# Patient Record
Sex: FEMALE | Employment: FULL TIME | URBAN - METROPOLITAN AREA
[De-identification: names, ages, dates, MRNs, and addresses within clinical notes are randomized per-mention and may not be internally consistent; named-entity substitution may affect disease eponyms.]

---

## 2018-06-15 ENCOUNTER — CLINICAL SUPPORT (OUTPATIENT)
Dept: URGENT CARE | Facility: CLINIC | Age: 58
End: 2018-06-15

## 2018-06-15 DIAGNOSIS — Z02.83 ENCOUNTER FOR DRUG SCREENING: Primary | ICD-10-CM

## 2018-06-15 PROCEDURE — 80305 DRUG TEST PRSMV DIR OPT OBS: CPT | Mod: S$GLB,,, | Performed by: PREVENTIVE MEDICINE

## 2022-02-11 ENCOUNTER — LAB VISIT (OUTPATIENT)
Dept: LAB | Facility: HOSPITAL | Age: 62
End: 2022-02-11
Attending: FAMILY MEDICINE
Payer: COMMERCIAL

## 2022-02-11 ENCOUNTER — OFFICE VISIT (OUTPATIENT)
Dept: PRIMARY CARE CLINIC | Facility: CLINIC | Age: 62
End: 2022-02-11
Payer: COMMERCIAL

## 2022-02-11 VITALS
TEMPERATURE: 98 F | OXYGEN SATURATION: 97 % | WEIGHT: 229.94 LBS | HEIGHT: 62 IN | DIASTOLIC BLOOD PRESSURE: 74 MMHG | BODY MASS INDEX: 42.31 KG/M2 | SYSTOLIC BLOOD PRESSURE: 136 MMHG | HEART RATE: 75 BPM

## 2022-02-11 DIAGNOSIS — Z00.00 ANNUAL PHYSICAL EXAM: ICD-10-CM

## 2022-02-11 DIAGNOSIS — K58.1 IRRITABLE BOWEL SYNDROME WITH CONSTIPATION: ICD-10-CM

## 2022-02-11 DIAGNOSIS — Z00.00 ANNUAL PHYSICAL EXAM: Primary | ICD-10-CM

## 2022-02-11 DIAGNOSIS — R25.3 MUSCLE TWITCHING: ICD-10-CM

## 2022-02-11 DIAGNOSIS — M25.50 ARTHRALGIA, UNSPECIFIED JOINT: ICD-10-CM

## 2022-02-11 DIAGNOSIS — M18.11 OSTEOARTHRITIS OF CARPOMETACARPAL (CMC) JOINT OF RIGHT THUMB, UNSPECIFIED OSTEOARTHRITIS TYPE: ICD-10-CM

## 2022-02-11 DIAGNOSIS — K21.9 GASTROESOPHAGEAL REFLUX DISEASE WITHOUT ESOPHAGITIS: ICD-10-CM

## 2022-02-11 DIAGNOSIS — F41.8 ANXIETY ASSOCIATED WITH DEPRESSION: ICD-10-CM

## 2022-02-11 DIAGNOSIS — R20.2 PARESTHESIA: ICD-10-CM

## 2022-02-11 DIAGNOSIS — F41.9 ANXIETY: ICD-10-CM

## 2022-02-11 DIAGNOSIS — K44.9 HIATAL HERNIA: ICD-10-CM

## 2022-02-11 PROBLEM — B00.9 HERPES: Status: ACTIVE | Noted: 2022-02-11

## 2022-02-11 PROBLEM — G47.33 OSA (OBSTRUCTIVE SLEEP APNEA): Status: ACTIVE | Noted: 2022-02-11

## 2022-02-11 PROBLEM — R23.2 HOT FLASHES: Status: ACTIVE | Noted: 2022-02-11

## 2022-02-11 LAB
BASOPHILS # BLD AUTO: 0.05 K/UL (ref 0–0.2)
BASOPHILS NFR BLD: 0.5 % (ref 0–1.9)
CCP AB SER IA-ACNC: 0.6 U/ML
DIFFERENTIAL METHOD: ABNORMAL
EOSINOPHIL # BLD AUTO: 0.1 K/UL (ref 0–0.5)
EOSINOPHIL NFR BLD: 1.5 % (ref 0–8)
ERYTHROCYTE [DISTWIDTH] IN BLOOD BY AUTOMATED COUNT: 13.3 % (ref 11.5–14.5)
HCT VFR BLD AUTO: 42.7 % (ref 37–48.5)
HGB BLD-MCNC: 13.3 G/DL (ref 12–16)
IMM GRANULOCYTES # BLD AUTO: 0.05 K/UL (ref 0–0.04)
IMM GRANULOCYTES NFR BLD AUTO: 0.5 % (ref 0–0.5)
LYMPHOCYTES # BLD AUTO: 3.7 K/UL (ref 1–4.8)
LYMPHOCYTES NFR BLD: 38.3 % (ref 18–48)
MCH RBC QN AUTO: 28.2 PG (ref 27–31)
MCHC RBC AUTO-ENTMCNC: 31.1 G/DL (ref 32–36)
MCV RBC AUTO: 91 FL (ref 82–98)
MONOCYTES # BLD AUTO: 0.5 K/UL (ref 0.3–1)
MONOCYTES NFR BLD: 5 % (ref 4–15)
NEUTROPHILS # BLD AUTO: 5.2 K/UL (ref 1.8–7.7)
NEUTROPHILS NFR BLD: 54.2 % (ref 38–73)
NRBC BLD-RTO: 0 /100 WBC
PLATELET # BLD AUTO: 364 K/UL (ref 150–450)
PMV BLD AUTO: 10.2 FL (ref 9.2–12.9)
RBC # BLD AUTO: 4.71 M/UL (ref 4–5.4)
WBC # BLD AUTO: 9.61 K/UL (ref 3.9–12.7)

## 2022-02-11 PROCEDURE — 82607 VITAMIN B-12: CPT | Performed by: FAMILY MEDICINE

## 2022-02-11 PROCEDURE — 99386 PREV VISIT NEW AGE 40-64: CPT | Mod: S$GLB,,, | Performed by: FAMILY MEDICINE

## 2022-02-11 PROCEDURE — 99999 PR PBB SHADOW E&M-NEW PATIENT-LVL IV: ICD-10-PCS | Mod: PBBFAC,,, | Performed by: FAMILY MEDICINE

## 2022-02-11 PROCEDURE — 84443 ASSAY THYROID STIM HORMONE: CPT | Performed by: FAMILY MEDICINE

## 2022-02-11 PROCEDURE — 83735 ASSAY OF MAGNESIUM: CPT | Performed by: FAMILY MEDICINE

## 2022-02-11 PROCEDURE — 86431 RHEUMATOID FACTOR QUANT: CPT | Performed by: FAMILY MEDICINE

## 2022-02-11 PROCEDURE — 87389 HIV-1 AG W/HIV-1&-2 AB AG IA: CPT | Performed by: FAMILY MEDICINE

## 2022-02-11 PROCEDURE — 36415 COLL VENOUS BLD VENIPUNCTURE: CPT | Mod: PN | Performed by: FAMILY MEDICINE

## 2022-02-11 PROCEDURE — 86803 HEPATITIS C AB TEST: CPT | Performed by: FAMILY MEDICINE

## 2022-02-11 PROCEDURE — 80061 LIPID PANEL: CPT | Performed by: FAMILY MEDICINE

## 2022-02-11 PROCEDURE — 99999 PR PBB SHADOW E&M-NEW PATIENT-LVL IV: CPT | Mod: PBBFAC,,, | Performed by: FAMILY MEDICINE

## 2022-02-11 PROCEDURE — 84439 ASSAY OF FREE THYROXINE: CPT | Performed by: FAMILY MEDICINE

## 2022-02-11 PROCEDURE — 85025 COMPLETE CBC W/AUTO DIFF WBC: CPT | Performed by: FAMILY MEDICINE

## 2022-02-11 PROCEDURE — 80053 COMPREHEN METABOLIC PANEL: CPT | Performed by: FAMILY MEDICINE

## 2022-02-11 PROCEDURE — 82728 ASSAY OF FERRITIN: CPT | Performed by: FAMILY MEDICINE

## 2022-02-11 PROCEDURE — 84466 ASSAY OF TRANSFERRIN: CPT | Performed by: FAMILY MEDICINE

## 2022-02-11 PROCEDURE — 86200 CCP ANTIBODY: CPT | Performed by: FAMILY MEDICINE

## 2022-02-11 PROCEDURE — 99386 PR PREVENTIVE VISIT,NEW,40-64: ICD-10-PCS | Mod: S$GLB,,, | Performed by: FAMILY MEDICINE

## 2022-02-11 RX ORDER — ACYCLOVIR 400 MG/1
400 TABLET ORAL 3 TIMES DAILY PRN
COMMUNITY
Start: 2021-10-29 | End: 2022-03-11 | Stop reason: ALTCHOICE

## 2022-02-11 RX ORDER — HYDROCODONE BITARTRATE AND ACETAMINOPHEN 5; 325 MG/1; MG/1
TABLET ORAL
COMMUNITY
Start: 2021-12-31 | End: 2022-02-11 | Stop reason: ALTCHOICE

## 2022-02-11 RX ORDER — MULTIVITAMIN/IRON/FOLIC ACID 18MG-0.4MG
1 TABLET ORAL DAILY
Start: 2022-02-11

## 2022-02-11 RX ORDER — CALCIUM CARB/VITAMIN D3/VIT K1 500-500-40
400 TABLET,CHEWABLE ORAL DAILY
Refills: 0
Start: 2022-02-11

## 2022-02-11 RX ORDER — DULOXETIN HYDROCHLORIDE 30 MG/1
30 CAPSULE, DELAYED RELEASE ORAL DAILY
Qty: 90 CAPSULE | Refills: 0 | Status: SHIPPED | OUTPATIENT
Start: 2022-02-11 | End: 2022-02-18 | Stop reason: ALTCHOICE

## 2022-02-11 RX ORDER — METHYLPREDNISOLONE 4 MG/1
TABLET ORAL
COMMUNITY
Start: 2021-12-31 | End: 2022-02-11 | Stop reason: ALTCHOICE

## 2022-02-11 RX ORDER — ESTRADIOL 0.1 MG/D
1 PATCH TRANSDERMAL
COMMUNITY
Start: 2021-10-14

## 2022-02-11 RX ORDER — HYDROGEN PEROXIDE 3 %
20 SOLUTION, NON-ORAL MISCELLANEOUS
COMMUNITY
End: 2023-03-17

## 2022-02-11 RX ORDER — TRAMADOL HYDROCHLORIDE 50 MG/1
50 TABLET ORAL
COMMUNITY
Start: 2021-09-24 | End: 2022-02-11 | Stop reason: ALTCHOICE

## 2022-02-11 RX ORDER — LUBIPROSTONE 24 UG/1
24 CAPSULE ORAL 2 TIMES DAILY
COMMUNITY
End: 2023-03-17

## 2022-02-11 RX ORDER — AMOXICILLIN 500 MG/1
CAPSULE ORAL
COMMUNITY
Start: 2021-09-24 | End: 2022-02-11

## 2022-02-11 NOTE — PROGRESS NOTES
[facility] Note    Chief Complaint      Chief Complaint   Patient presents with    [facility] [place] [facility]    Annual Exam    Depression    Anxiety    Constipation       History of Present Illness      Sandra Velazquez is a 62 y.o. female with chronic conditions of  IBS constipation, GERD, OA hands and knees who presents today for:    GI - constipation.  Food getting stuck 2 years ago  Stiffness in hands - dx OA Dr Eugene Logan.  meloxicam and steroids  tremor in thumb  Mental health - COVID, family/son.  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 2 meals - protein, carb some salad  Water - increasing.  Getting 64oz.     +vape not ready to quit  Rare alcohol             Patient Care Team:  Mahnaz Gay MD as PCP - General (Internal Medicine)     Health Maintenance:    There is no immunization history on file for this patient.   Health Maintenance   Topic Date Due    Hepatitis C Screening  Never done    Lipid Panel  Never done    TETANUS VACCINE  Never done    Mammogram  Never done        Past Medical History:  Past Medical History:   Diagnosis Date    Constipation     Deafness in right ear     Hemorrhoids     Hyperlipidemia        Past Surgical History:   has a past surgical history that includes Total abdominal hysterectomy (2004); Carpal tunnel release (Bilateral); and Bilateral salpingo-oophorectomy (BSO).    Family History:  family history includes Asthma in her mother; Bell's palsy in her father; Crohn's disease in her father; GI problems in her mother; Hyperlipidemia in her sister; Hypertension in her father; Scoliosis in her  sister; Stroke (age of onset: 65) in her father.     Social History:  Social History     Tobacco Use    Smoking status: Current Every Day Smoker     Types: Vaping with nicotine    Smokeless tobacco: Never Used   Substance Use Topics    Drug use: Yes     Types: Marijuana     Comment: CBD oil - joints anxiety       Review of Systems   Constitutional: Negative for fever.   Respiratory: Negative for shortness of breath.    Cardiovascular: Negative for chest pain.   Gastrointestinal: Positive for constipation.   Genitourinary: Negative for difficulty urinating.        Medications:    Current Outpatient Medications:     esomeprazole (NEXIUM) 20 MG capsule, Take 20 mg by mouth before breakfast., Disp: , Rfl:     estradiol 0.1 mg/24 hr td ptwk 0.1 mg/24 hr PTWK, Place 1 patch onto the skin every 7 days., Disp: , Rfl:     lubiprostone (AMITIZA) 24 MCG Cap, Take 24 mcg by mouth 2 (two) times daily., Disp: , Rfl:     meloxicam 15 mg TbDL, Take 15 mg by mouth once daily., Disp: , Rfl:     acyclovir (ZOVIRAX) 400 MG tablet, Take 400 mg by mouth 3 (three) times daily as needed., Disp: , Rfl:     cholecalciferol, vitamin D3, 10 mcg (400 unit) Cap, Take 1 capsule (400 Units total) by mouth once daily., Disp: , Rfl: 0    DULoxetine (CYMBALTA) 30 MG capsule, Take 1 capsule (30 mg total) by mouth once daily., Disp: 90 capsule, Rfl: 0    varicella-zoster gE-AS01B, PF, (SHINGRIX, PF,) 50 mcg/0.5 mL injection, Inject 0.5 mLs into the muscle once. for 1 dose, Disp: 1 each, Rfl: 0    vitamin B complex-folic acid (B COMPLEX 1, WITH FOLIC ACID,) 0.4 mg Tab, Take 1 tablet by mouth once daily., Disp: , Rfl:      Allergies:  Review of patient's allergies indicates:   Allergen Reactions    Darvocet a500 [propoxyphene n-acetaminophen] Hallucinations    Tramadol Nausea And Vomiting       Physical Exam      Vital Signs  Temp: 98.4 °F (36.9 °C)  Temp src: Oral  Pulse: 75  SpO2: 97 %  BP: 136/74  Pain Score:   4  Pain Loc: Knee  Height  "and Weight  Height: 5' 1.5" (156.2 cm)  Weight: 104.3 kg (229 lb 15 oz)  BSA (Calculated - sq m): 2.13 sq meters  BMI (Calculated): 42.7  Weight in (lb) to have BMI = 25: 134.2             Physical Exam  Vitals reviewed.   Constitutional:       General: She is not in acute distress.     Appearance: Normal appearance.   HENT:      Right Ear: Tympanic membrane, ear canal and external ear normal.      Left Ear: Tympanic membrane, ear canal and external ear normal.      Mouth/Throat:      Mouth: Mucous membranes are moist.      Pharynx: Oropharynx is clear. No oropharyngeal exudate or posterior oropharyngeal erythema.   Eyes:      Extraocular Movements: Extraocular movements intact.      Conjunctiva/sclera: Conjunctivae normal.      Pupils: Pupils are equal, round, and reactive to light.   Cardiovascular:      Rate and Rhythm: Normal rate and regular rhythm.      Pulses: Normal pulses.      Heart sounds: No murmur heard.  No friction rub. No gallop.    Pulmonary:      Effort: Pulmonary effort is normal.      Breath sounds: No wheezing, rhonchi or rales.   Abdominal:      General: Abdomen is flat. Bowel sounds are normal. There is no distension.      Palpations: Abdomen is soft. There is no mass.      Tenderness: There is no abdominal tenderness.   Musculoskeletal:      Cervical back: Neck supple.      Right lower leg: No edema.      Left lower leg: No edema.      Comments: Left knee with from and no joint line ttp, neg ant/post drawer, neg chencho  Arthritic changes in bilateral DIP hands, and 1st carpo meta carp, and mcp   Lymphadenopathy:      Cervical: No cervical adenopathy.   Skin:     General: Skin is warm and dry.   Neurological:      General: No focal deficit present.      Mental Status: She is alert.   Psychiatric:         Mood and Affect: Mood normal.         Behavior: Behavior normal.          Laboratory:  CBC:        CMP:            URINALYSIS:         LIPIDS:        TSH:        A1C:        Urine " Microalbumin/Cr:          Other:             Assessment/Plan     Sandra Velazquez is a 62 y.o.female with:    Annual physical exam  -     CBC Auto Differential; Future; Expected date: 02/11/2022  -     Comprehensive Metabolic Panel; Future; Expected date: 02/11/2022  -     Lipid Panel; Future; Expected date: 02/11/2022  -     Hepatitis C Antibody; Future; Expected date: 02/11/2022  -     HIV 1/2 Ag/Ab (4th Gen); Future; Expected date: 02/11/2022  Physical activity, diet, healthy lifestyle, alcohol, tobacco, screenings and immunizations discussed.    Anxiety associated with depression  Start cymbalta. SE, indications and instructions discussed. We will reasses anxiety, pain and constipation in 1 month    Osteoarthritis of carpometacarpal (CMC) joint of right thumb, unspecified osteoarthritis type  -     Ambulatory referral/consult to Hand Surgery; Future; Expected date: 02/18/2022  Likely needs tendon release right thumb. Has some improvement with oral steroid from ortho    Arthralgia, unspecified joint  -     Rheumatoid Factor; Future; Expected date: 02/11/2022  -     CYCLIC CITRUL PEPTIDE ANTIBODY, IGG; Future; Expected date: 02/11/2022  Multiple joints involved.  Rheumatoid unlikely but will rule out    Gastroesophageal reflux disease without esophagitis  Last EGD 2 years ago, GI at [facility].  PPI helps but does not resolve symptoms.  Will see if symptoms improve with anxiety control    Irritable bowel syndrome with constipation  -     T4, Free; Future; Expected date: 02/11/2022  -     TSH; Future; Expected date: 02/11/2022  Uses amitiza with relief prn, does not want to take every day,  Fiber makes worse. Will reassess post anxiety treatment.    Anxiety  -     T4, Free; Future; Expected date: 02/11/2022  -     TSH; Future; Expected date: 02/11/2022  Add cymbalta    Paresthesia  -     Vitamin B12; Future; Expected date: 02/11/2022    Muscle twitching  -     Vitamin B12; Future; Expected date: 02/11/2022  -      Magnesium; Future; Expected date: 02/11/2022  -     Iron and TIBC; Future; Expected date: 02/11/2022  -     Ferritin; Future; Expected date: 02/11/2022    Hiatal hernia  ppi    Other orders  -     vitamin B complex-folic acid (B COMPLEX 1, WITH FOLIC ACID,) 0.4 mg Tab; Take 1 tablet by mouth once daily.  -     cholecalciferol, vitamin D3, 10 mcg (400 unit) Cap; Take 1 capsule (400 Units total) by mouth once daily.; Refill: 0  -     DULoxetine (CYMBALTA) 30 MG capsule; Take 1 capsule (30 mg total) by mouth once daily.  Dispense: 90 capsule; Refill: 0         Chronic conditions status updated as per HPI.  Other than changes above, cont current medications and maintain follow up with specialists.  Return to clinic in Follow up in about 4 weeks (around 3/11/2022).      Mahnaz Gay MD  [facility]

## 2022-02-11 NOTE — PATIENT INSTRUCTIONS
1. Start cymbalta (duloxetine) 30 mg daily  2. Get fasting labs  3. First shingles shot today and return in 1 month for second  4. Increase vegetable intake and activity  5. Make appointment with hand surgeon

## 2022-02-12 LAB
ALBUMIN SERPL BCP-MCNC: 3.8 G/DL (ref 3.5–5.2)
ALP SERPL-CCNC: 64 U/L (ref 55–135)
ALT SERPL W/O P-5'-P-CCNC: 15 U/L (ref 10–44)
ANION GAP SERPL CALC-SCNC: 11 MMOL/L (ref 8–16)
AST SERPL-CCNC: 18 U/L (ref 10–40)
BILIRUB SERPL-MCNC: 0.6 MG/DL (ref 0.1–1)
BUN SERPL-MCNC: 7 MG/DL (ref 8–23)
CALCIUM SERPL-MCNC: 9.6 MG/DL (ref 8.7–10.5)
CHLORIDE SERPL-SCNC: 104 MMOL/L (ref 95–110)
CHOLEST SERPL-MCNC: 230 MG/DL (ref 120–199)
CHOLEST/HDLC SERPL: 4.3 {RATIO} (ref 2–5)
CO2 SERPL-SCNC: 24 MMOL/L (ref 23–29)
CREAT SERPL-MCNC: 0.7 MG/DL (ref 0.5–1.4)
EST. GFR  (AFRICAN AMERICAN): >60 ML/MIN/1.73 M^2
EST. GFR  (NON AFRICAN AMERICAN): >60 ML/MIN/1.73 M^2
FERRITIN SERPL-MCNC: 47 NG/ML (ref 20–300)
GLUCOSE SERPL-MCNC: 78 MG/DL (ref 70–110)
HDLC SERPL-MCNC: 54 MG/DL (ref 40–75)
HDLC SERPL: 23.5 % (ref 20–50)
IRON SERPL-MCNC: 70 UG/DL (ref 30–160)
LDLC SERPL CALC-MCNC: 148.8 MG/DL (ref 63–159)
MAGNESIUM SERPL-MCNC: 1.9 MG/DL (ref 1.6–2.6)
NONHDLC SERPL-MCNC: 176 MG/DL
POTASSIUM SERPL-SCNC: 4.4 MMOL/L (ref 3.5–5.1)
PROT SERPL-MCNC: 7.5 G/DL (ref 6–8.4)
RHEUMATOID FACT SERPL-ACNC: <13 IU/ML (ref 0–15)
SATURATED IRON: 16 % (ref 20–50)
SODIUM SERPL-SCNC: 139 MMOL/L (ref 136–145)
T4 FREE SERPL-MCNC: 0.87 NG/DL (ref 0.71–1.51)
TOTAL IRON BINDING CAPACITY: 428 UG/DL (ref 250–450)
TRANSFERRIN SERPL-MCNC: 289 MG/DL (ref 200–375)
TRIGL SERPL-MCNC: 136 MG/DL (ref 30–150)
TSH SERPL DL<=0.005 MIU/L-ACNC: 0.65 UIU/ML (ref 0.4–4)
VIT B12 SERPL-MCNC: 1353 PG/ML (ref 210–950)

## 2022-02-14 LAB
HCV AB SERPL QL IA: NEGATIVE
HIV 1+2 AB+HIV1 P24 AG SERPL QL IA: NEGATIVE

## 2022-02-18 ENCOUNTER — TELEPHONE (OUTPATIENT)
Dept: PRIMARY CARE CLINIC | Facility: CLINIC | Age: 62
End: 2022-02-18
Payer: COMMERCIAL

## 2022-02-18 ENCOUNTER — PATIENT MESSAGE (OUTPATIENT)
Dept: PRIMARY CARE CLINIC | Facility: CLINIC | Age: 62
End: 2022-02-18
Payer: COMMERCIAL

## 2022-02-18 RX ORDER — VENLAFAXINE HYDROCHLORIDE 37.5 MG/1
37.5 CAPSULE, EXTENDED RELEASE ORAL DAILY
Qty: 90 CAPSULE | Refills: 1 | Status: SHIPPED | OUTPATIENT
Start: 2022-02-18 | End: 2022-03-11 | Stop reason: ALTCHOICE

## 2022-02-18 NOTE — TELEPHONE ENCOUNTER
Spoke the patient, she reviewed the information that was in the [employer] message. She also added that the medication made her feel as though she was hallucinating as well. When taking the Cymbalta she was only getting 2-3 hours a night. Before starting the medication she advised that she was only getting about 4-5 hours with melatonin. Patient advised that she mentioned Ativan in her message because she was prescribed this several years ago, and it did work well for her. Please advise.

## 2022-02-18 NOTE — TELEPHONE ENCOUNTER
Patient has been informed, and verbalized understanding.    She asked why the ativan is not recommended as a daily medication. Explained that ativan is a controlled substance, and is more to be used on an as needed or acute bases instead of a daily basis.     Pt verbalized understanding, but requested a call from the provider to discuss the Effexor.

## 2022-02-18 NOTE — TELEPHONE ENCOUNTER
----- Message from Venus Sheriff sent at 2/18/2022 12:37 PM CST -----  Contact: [phone]  Patient would like to speak to the nurse in regards to the reaction she thinks she is having to a current Rx. Please advise

## 2022-02-25 DIAGNOSIS — M79.641 RIGHT HAND PAIN: Primary | ICD-10-CM

## 2022-03-11 ENCOUNTER — TELEPHONE (OUTPATIENT)
Dept: BEHAVIORAL HEALTH | Facility: CLINIC | Age: 62
End: 2022-03-11
Payer: COMMERCIAL

## 2022-03-11 ENCOUNTER — PATIENT MESSAGE (OUTPATIENT)
Dept: BEHAVIORAL HEALTH | Facility: CLINIC | Age: 62
End: 2022-03-11
Payer: COMMERCIAL

## 2022-03-11 ENCOUNTER — OFFICE VISIT (OUTPATIENT)
Dept: PRIMARY CARE CLINIC | Facility: CLINIC | Age: 62
End: 2022-03-11
Payer: COMMERCIAL

## 2022-03-11 VITALS
OXYGEN SATURATION: 98 % | HEART RATE: 95 BPM | SYSTOLIC BLOOD PRESSURE: 120 MMHG | BODY MASS INDEX: 42.36 KG/M2 | TEMPERATURE: 98 F | RESPIRATION RATE: 20 BRPM | HEIGHT: 62 IN | WEIGHT: 230.19 LBS | DIASTOLIC BLOOD PRESSURE: 76 MMHG

## 2022-03-11 DIAGNOSIS — Z79.890 HORMONE REPLACEMENT THERAPY (HRT): Primary | ICD-10-CM

## 2022-03-11 DIAGNOSIS — F41.8 ANXIETY ASSOCIATED WITH DEPRESSION: ICD-10-CM

## 2022-03-11 PROCEDURE — 99999 PR PBB SHADOW E&M-EST. PATIENT-LVL V: ICD-10-PCS | Mod: PBBFAC,,, | Performed by: FAMILY MEDICINE

## 2022-03-11 PROCEDURE — 99213 OFFICE O/P EST LOW 20 MIN: CPT | Mod: S$GLB,,, | Performed by: FAMILY MEDICINE

## 2022-03-11 PROCEDURE — 99999 PR PBB SHADOW E&M-EST. PATIENT-LVL V: CPT | Mod: PBBFAC,,, | Performed by: FAMILY MEDICINE

## 2022-03-11 PROCEDURE — 99213 PR OFFICE/OUTPT VISIT, EST, LEVL III, 20-29 MIN: ICD-10-PCS | Mod: S$GLB,,, | Performed by: FAMILY MEDICINE

## 2022-03-11 RX ORDER — PROPRANOLOL HYDROCHLORIDE 10 MG/1
10 TABLET ORAL 3 TIMES DAILY
Qty: 270 TABLET | Refills: 0 | Status: SHIPPED | OUTPATIENT
Start: 2022-03-11 | End: 2022-06-07

## 2022-03-11 NOTE — PROGRESS NOTES
Contacted patient no answer left VM. Sent [employer] message to pls call.    Behavioral Health Community Health Worker  Initial Assessment  Completed by: Bridgett Feliz     Date:  3/11/2022    Patient Enrollment in Behavioral Health Program:  · Patient verbalized understanding of Behavioral Health Integration services to include:  · Patient understands that CHW, LCSW, PharmD and consulting Psychiatrist are members of the care team working collaboratively with his/her primary care provider: Yes  · Patient understands that activation of their [facility] patient portal account is required for accessing the full scope of team services: Yes  · Patient understands that some counseling sessions may occur via video: Yes  · Clinic visits with the psychiatrist may be subject to a co-pay based on your insurance: Yes  · Patient consents to enroll in BHI program: Yes    Assessments     Single Item Health Literacy Scale:  · How often do you need to have someone help you read instructions, pamphlets or other written material from your doctor or pharmacy?: Never    Promis 10:  · Promis 10 Responses  · In general, would you say your health is: Poor  · In general, would you say your quality of life is: Fair  · In general, how would you rate your physical health?: Poor  · In general, how would you rate your mental health, including your mood and your ability to think?: Fair  · In general, how would you rate your satisfaction with your social activities and relationships?: Fair  · In general, please rate how well you carry out your usual social activities and roles. (This includes activities at home, at work and in your community, and responsibilities as a parent, child, spouse, employee, friend, etc.): Fair  · To what extent are you able to carry out your everyday physical activities such as walking, climbing stairs, carrying groceries, or moving a chair? : A little  · How often have you been bothered by emotional problems such as feeling  "anxious, depressed or irritable?: Often  · In the past 7 days, how would you rate your fatigue on average?: Severe  · In the past 7 days, on a scale of 0 to 10 (where 0 is no pain and 10 is the worst pain imaginable) how would you rate your pain on average?: 5  · Global Physical Health: 12  · Global Mental health Score: 10    Depression PHQ:  PHQ9 3/11/2022   Total Score 16         Generalized Anxiety Disorder 7-Item Scale:  GAD7 3/11/2022   1. Feeling nervous, anxious, or on edge? 3   2. Not being able to stop or control worrying? 1   3. Worrying too much about different things? 1   4. Trouble relaxing? 1   5. Being so restless that it is hard to sit still? 1   6. Becoming easily annoyed or irritable? 1   7. Feeling afraid as if something awful might happen? 1   8. If you checked off any problems, how difficult have these problems made it for you to do your work, take care of things at home, or get along with other people? 1   JOHN-7 Score 9       History     Social History     Socioeconomic History    Marital status:    Tobacco Use    Smoking status: Current Every Day Smoker     Types: Vaping with nicotine    Smokeless tobacco: Never Used   Substance and Sexual Activity    Drug use: Yes     Types: Marijuana     Comment: CBD oil - joints anxiety    Sexual activity: Yes     Partners: Male       Call Summary     Patient was referred to the BHI (Non-opioid) program by Primary Care Provider, Dr. Mahnaz Gay. CHW contacted Sandra Velazquez who reports depression and anxiety that limits her activities of daily living (ADLs).   Patient scored "16" on the PHQ9 and "9" on the JOHN 7. Based on these scores patient is eligible for the Behavioral Health Integration (Non-opioid) Program. CHW completed the intake and scheduled an office appointment for patient with Silva Mondragon LCSW, on 03/14/22 at 8:30am.         "

## 2022-03-11 NOTE — PROGRESS NOTES
"[facility] Note    Chief Complaint      Chief Complaint   Patient presents with    Follow-up     Insomnia,,??? hormones       History of Present Illness      Sandra Velazquez is a 62 y.o. female with chronic conditions of anxiety, GERD, BAR, IBS, hot flash who presents today for:  Started cymbalta and had nausea and insomnia. Took for 2 nights and stopped it. 1 Week later tried venlafaxine and had HA nausea palpitation.  Took only 2 doses.   She does not want to take these medications.    Was previously taking mag but instead started Slo mag and mag citrate.  This helps with her bowels and she thinks may have improved mood.  She thinks longer daylight hours may have improved her mood as well.    Anxiety - news is trigger, gets panic type symptoms with driving and tasks "jittery"     Hot flashes daily, vaginal dryness, poor sleep even with CPAP.  Feels good on days she puts estrogen  patch on but by end of week fels wears of and hor flashes increase  She is on 0.1 climara    Patient Care Team:  Mahnaz Gay MD as PCP - General (Internal Medicine)     Health Maintenance:    There is no immunization history on file for this patient.   Health Maintenance   Topic Date Due    TETANUS VACCINE  Never done    Mammogram  Never done    Lipid Panel  02/11/2027    Hepatitis C Screening  Completed        Past Medical History:  Past Medical History:   Diagnosis Date    Constipation     Deafness in right ear     Hemorrhoids     Hyperlipidemia        Past Surgical History:   has a past surgical history that includes Total abdominal hysterectomy (2004); Carpal tunnel release (Bilateral); and Bilateral salpingo-oophorectomy (BSO).    Family History:  family history includes Asthma in her mother; Bell's palsy in her father; Crohn's disease in her father; GI problems in her mother; Hyperlipidemia in her sister; Hypertension in her father; Scoliosis in her sister; Stroke (age of onset: 65) in her father. "     Social History:  Social History     Tobacco Use    Smoking status: Current Every Day Smoker     Types: Vaping with nicotine    Smokeless tobacco: Never Used   Substance Use Topics    Drug use: Yes     Types: Marijuana     Comment: CBD oil - joints anxiety       Review of Systems   Constitutional: Negative for fever.   Respiratory: Negative for shortness of breath.    Cardiovascular: Negative for chest pain.   Gastrointestinal: Negative for change in bowel habit and change in bowel habit.   Genitourinary: Negative for difficulty urinating.        Medications:    Current Outpatient Medications:     cholecalciferol, vitamin D3, 10 mcg (400 unit) Cap, Take 1 capsule (400 Units total) by mouth once daily., Disp: , Rfl: 0    esomeprazole (NEXIUM) 20 MG capsule, Take 20 mg by mouth before breakfast., Disp: , Rfl:     estradiol 0.1 mg/24 hr td ptwk 0.1 mg/24 hr PTWK, Place 1 patch onto the skin every 7 days., Disp: , Rfl:     lubiprostone (AMITIZA) 24 MCG Cap, Take 24 mcg by mouth 2 (two) times daily., Disp: , Rfl:     meloxicam 15 mg TbDL, Take 15 mg by mouth once daily., Disp: , Rfl:     vitamin B complex-folic acid (B COMPLEX 1, WITH FOLIC ACID,) 0.4 mg Tab, Take 1 tablet by mouth once daily., Disp: , Rfl:     acyclovir (ZOVIRAX) 400 MG tablet, Take 400 mg by mouth 3 (three) times daily as needed., Disp: , Rfl:     propranoloL (INDERAL) 10 MG tablet, Take 1 tablet (10 mg total) by mouth 3 (three) times daily., Disp: 270 tablet, Rfl: 0    venlafaxine (EFFEXOR-XR) 37.5 MG 24 hr capsule, Take 1 capsule (37.5 mg total) by mouth once daily., Disp: 90 capsule, Rfl: 1     Allergies:  Review of patient's allergies indicates:   Allergen Reactions    Darvocet a500 [propoxyphene n-acetaminophen] Hallucinations    Oxycodone-acetaminophen Nausea And Vomiting    Oxycodone-aspirin Nausea And Vomiting    Tramadol Nausea And Vomiting       Physical Exam      Vital Signs  Temp: 97.9 °F (36.6 °C)  Pulse: 95  Resp:  "20  SpO2: 98 %  BP: 120/76  BP Location: Right arm  Pain Score: 0-No pain  Height and Weight  Height: 5' 1.5" (156.2 cm)  Weight: 104.4 kg (230 lb 2.6 oz)  BSA (Calculated - sq m): 2.13 sq meters  BMI (Calculated): 42.8  Weight in (lb) to have BMI = 25: 134.2             Physical Exam  Vitals reviewed.   Constitutional:       General: She is not in acute distress.     Appearance: Normal appearance.   HENT:      Right Ear: External ear normal.      Left Ear: External ear normal.   Eyes:      Conjunctiva/sclera: Conjunctivae normal.   Pulmonary:      Effort: Pulmonary effort is normal.   Abdominal:      General: Abdomen is flat.   Musculoskeletal:      Cervical back: Neck supple.      Right lower leg: No edema.      Left lower leg: No edema.   Skin:     General: Skin is warm and dry.   Neurological:      General: No focal deficit present.      Mental Status: She is alert.   Psychiatric:         Mood and Affect: Mood normal.         Behavior: Behavior normal.          Laboratory:  CBC:  Recent Labs   Lab 02/11/22  1130   WBC 9.61   RBC 4.71   Hemoglobin 13.3   Hematocrit 42.7   Platelets 364   MCV 91   MCH 28.2   MCHC 31.1 L       CMP:  Recent Labs   Lab 02/11/22  1130   Glucose 78   Calcium 9.6   Albumin 3.8   Total Protein 7.5   Sodium 139   Potassium 4.4   CO2 24   Chloride 104   BUN 7 L   Creatinine 0.7   Alkaline Phosphatase 64   ALT 15   AST 18   Total Bilirubin 0.6           URINALYSIS:         LIPIDS:  Recent Labs   Lab 02/11/22  1130   TSH 0.651   HDL 54   Cholesterol 230 H   Triglycerides 136   LDL Cholesterol 148.8   HDL/Cholesterol Ratio 23.5   Non-HDL Cholesterol 176   Total Cholesterol/HDL Ratio 4.3       TSH:  Recent Labs   Lab 02/11/22  1130   TSH 0.651       A1C:        Urine Microalbumin/Cr:          Other:   Recent Labs   Lab 02/11/22  1130   Vitamin B-12 1353 H   Ferritin 47   Iron 70   Transferrin 289   TIBC 428   Saturated Iron 16 L     Recent Labs   Lab 02/11/22  1130   Hepatitis C Ab Negative "       Assessment/Plan     Sandra Velazquez is a 62 y.o.female with:    Hormone replacement therapy (HRT)  -     Ambulatory referral/consult to Obstetrics / Gynecology; Future; Expected date: 03/18/2022  Refer to Dr Eugene for review of appropriate HRT    Anxiety associated with depression  -     Ambulatory Referral to Primary Care Behavioral Health (Non-Opioids); Future; Expected date: 03/18/2022  Add propranolol daily to BID, Side effects, indications and instructions discussed    Other orders  -     propranoloL (INDERAL) 10 MG tablet; Take 1 tablet (10 mg total) by mouth 3 (three) times daily.  Dispense: 270 tablet; Refill: 0         Chronic conditions status updated as per HPI.  Other than changes above, cont current medications and maintain follow up with specialists.  Return to clinic in Follow up in about 3 weeks (around 4/1/2022) for Virtual Visit.      Mahnaz Gay MD  [facility]

## 2022-03-11 NOTE — PATIENT INSTRUCTIONS
Start propranolol, ok to titrate to maximum dosage of 20mg three times a day  Referral sent to Dr. Eugene to review hormone levels and replacement.   Get shingles shot at your convenience

## 2022-03-13 DIAGNOSIS — Z12.31 OTHER SCREENING MAMMOGRAM: ICD-10-CM

## 2022-03-13 DIAGNOSIS — Z12.11 COLON CANCER SCREENING: ICD-10-CM

## 2022-03-14 ENCOUNTER — TELEPHONE (OUTPATIENT)
Dept: BEHAVIORAL HEALTH | Facility: CLINIC | Age: 62
End: 2022-03-14
Payer: COMMERCIAL

## 2022-03-16 ENCOUNTER — PATIENT MESSAGE (OUTPATIENT)
Dept: BEHAVIORAL HEALTH | Facility: CLINIC | Age: 62
End: 2022-03-16
Payer: COMMERCIAL

## 2022-03-16 ENCOUNTER — TELEPHONE (OUTPATIENT)
Dept: BEHAVIORAL HEALTH | Facility: CLINIC | Age: 62
End: 2022-03-16
Payer: COMMERCIAL

## 2022-03-16 NOTE — PROGRESS NOTES
CHW reached out to pt to reschedule new pt appointment with Silva Mondragon LCSW. Pt indicated that she will call back.

## 2022-03-18 ENCOUNTER — PATIENT MESSAGE (OUTPATIENT)
Dept: ADMINISTRATIVE | Facility: HOSPITAL | Age: 62
End: 2022-03-18
Payer: COMMERCIAL

## 2022-03-31 ENCOUNTER — PATIENT OUTREACH (OUTPATIENT)
Dept: BEHAVIORAL HEALTH | Facility: CLINIC | Age: 62
End: 2022-03-31
Payer: COMMERCIAL

## 2022-04-04 ENCOUNTER — PATIENT MESSAGE (OUTPATIENT)
Dept: BEHAVIORAL HEALTH | Facility: CLINIC | Age: 62
End: 2022-04-04
Payer: COMMERCIAL

## 2022-04-07 ENCOUNTER — PATIENT MESSAGE (OUTPATIENT)
Dept: BEHAVIORAL HEALTH | Facility: CLINIC | Age: 62
End: 2022-04-07
Payer: COMMERCIAL

## 2022-04-07 ENCOUNTER — TELEPHONE (OUTPATIENT)
Dept: BEHAVIORAL HEALTH | Facility: CLINIC | Age: 62
End: 2022-04-07
Payer: COMMERCIAL

## 2022-04-07 NOTE — PROGRESS NOTES
CHW reached out to pt to reschedule an appointment with Silva Mondragon LCSW. No answer, left VM. Sent MyChart message to have PCP refer her again in future.

## 2022-05-30 ENCOUNTER — PATIENT MESSAGE (OUTPATIENT)
Dept: ADMINISTRATIVE | Facility: HOSPITAL | Age: 62
End: 2022-05-30
Payer: COMMERCIAL

## 2022-06-07 RX ORDER — PROPRANOLOL HYDROCHLORIDE 10 MG/1
TABLET ORAL
Qty: 270 TABLET | Refills: 0 | Status: SHIPPED | OUTPATIENT
Start: 2022-06-07 | End: 2022-10-10 | Stop reason: SDUPTHER

## 2022-07-29 ENCOUNTER — PATIENT MESSAGE (OUTPATIENT)
Dept: ADMINISTRATIVE | Facility: HOSPITAL | Age: 62
End: 2022-07-29
Payer: COMMERCIAL

## 2022-07-29 ENCOUNTER — PATIENT OUTREACH (OUTPATIENT)
Dept: ADMINISTRATIVE | Facility: HOSPITAL | Age: 62
End: 2022-07-29
Payer: COMMERCIAL

## 2022-07-29 NOTE — PROGRESS NOTES
Patient due for the following    COVID-19 Vaccine (1)    Pneumococcal Vaccines (Age 0-64) (1 - PCV)    TETANUS VACCINE     Mammogram     Colorectal Cancer Screening     Shingles Vaccine (1 of 2)     Immunizations: no records in [facility]  Care Everywhere: [facility] Teams: up to date  Outreach: completed

## 2022-08-24 ENCOUNTER — PATIENT MESSAGE (OUTPATIENT)
Dept: ADMINISTRATIVE | Facility: HOSPITAL | Age: 62
End: 2022-08-24
Payer: COMMERCIAL

## 2022-10-10 ENCOUNTER — PATIENT MESSAGE (OUTPATIENT)
Dept: ADMINISTRATIVE | Facility: HOSPITAL | Age: 62
End: 2022-10-10
Payer: COMMERCIAL

## 2022-10-10 NOTE — TELEPHONE ENCOUNTER
No new care gaps identified.  [facility] Embedded Care Gaps. Reference number: [id number]. 10/10/2022   9:02:24 AM CDT

## 2022-10-11 RX ORDER — PROPRANOLOL HYDROCHLORIDE 10 MG/1
10 TABLET ORAL 3 TIMES DAILY
Qty: 270 TABLET | Refills: 0 | Status: SHIPPED | OUTPATIENT
Start: 2022-10-11 | End: 2023-03-17 | Stop reason: SINTOL

## 2022-11-28 ENCOUNTER — HOSPITAL ENCOUNTER (OUTPATIENT)
Dept: RADIOLOGY | Facility: HOSPITAL | Age: 62
Discharge: HOME | End: 2022-11-28
Attending: FAMILY MEDICINE
Payer: COMMERCIAL

## 2022-11-28 DIAGNOSIS — Z12.31 OTHER SCREENING MAMMOGRAM: ICD-10-CM

## 2022-11-28 PROCEDURE — 77063 BREAST TOMOSYNTHESIS BI: CPT | Mod: TC

## 2022-11-28 PROCEDURE — 77063 BREAST TOMOSYNTHESIS BI: CPT | Mod: 26,,, | Performed by: RADIOLOGY

## 2022-11-28 PROCEDURE — 77063 MAMMO DIGITAL SCREENING BILAT WITH TOMO: ICD-10-PCS | Mod: 26,,, | Performed by: RADIOLOGY

## 2022-11-28 PROCEDURE — 77067 SCR MAMMO BI INCL CAD: CPT | Mod: 26,,, | Performed by: RADIOLOGY

## 2022-11-28 PROCEDURE — 77067 MAMMO DIGITAL SCREENING BILAT WITH TOMO: ICD-10-PCS | Mod: 26,,, | Performed by: RADIOLOGY

## 2022-12-02 DIAGNOSIS — R92.8 ABNORMAL MAMMOGRAM: Primary | ICD-10-CM

## 2022-12-09 ENCOUNTER — PATIENT MESSAGE (OUTPATIENT)
Dept: PRIMARY CARE CLINIC | Facility: CLINIC | Age: 62
End: 2022-12-09
Payer: COMMERCIAL

## 2022-12-13 ENCOUNTER — PATIENT MESSAGE (OUTPATIENT)
Dept: PRIMARY CARE CLINIC | Facility: CLINIC | Age: 62
End: 2022-12-13
Payer: COMMERCIAL

## 2023-01-05 ENCOUNTER — TELEPHONE (OUTPATIENT)
Dept: PRIMARY CARE CLINIC | Facility: CLINIC | Age: 63
End: 2023-01-05
Payer: COMMERCIAL

## 2023-01-05 NOTE — TELEPHONE ENCOUNTER
Spoke with the pt, who advised that the CPT & Dx codes were needed for he insurance to determine coverage.     Pt requested mammogram report be sent to her OB/GYN Dr. Binta Aguirre.     Phone: [phone] (Provider's Cell)  Fax: [phone]    Mammogram report faxed to Dr. Aguirre per the patient's request.

## 2023-01-05 NOTE — TELEPHONE ENCOUNTER
----- Message from Kristine Gurrola sent at 1/5/2023 12:09 PM CST -----  Contact: pt [phone]  Patient stated that they missed a call from this office.    Please call and advise.    Thank You

## 2023-01-05 NOTE — TELEPHONE ENCOUNTER
Call transferred to 4 different departments within [facility], but the representatives were unable to see who called or what the need was for.     Left a voicemail requesting a return phone call, to determine if she had been contacted about the imaging coding.

## 2023-01-05 NOTE — TELEPHONE ENCOUNTER
----- Message from Reny Carney sent at 1/4/2023  4:05 PM CST -----  Contact: Nigel/[phone] or contact the member Nigel townsend called, in regards needing a cpt code on the ultrasound on 01/20/23. Or contact pt directly to see if she required prior authorization. Please call and advise. Thank you

## 2023-01-06 ENCOUNTER — PATIENT MESSAGE (OUTPATIENT)
Dept: PRIMARY CARE CLINIC | Facility: CLINIC | Age: 63
End: 2023-01-06
Payer: COMMERCIAL

## 2023-01-20 ENCOUNTER — HOSPITAL ENCOUNTER (OUTPATIENT)
Dept: RADIOLOGY | Facility: HOSPITAL | Age: 63
Discharge: HOME | End: 2023-01-20
Attending: FAMILY MEDICINE
Payer: COMMERCIAL

## 2023-01-20 DIAGNOSIS — R92.8 ABNORMAL MAMMOGRAM: ICD-10-CM

## 2023-01-20 PROCEDURE — 76642 ULTRASOUND BREAST LIMITED: CPT | Mod: TC,RT

## 2023-01-20 PROCEDURE — 77061 BREAST TOMOSYNTHESIS UNI: CPT | Mod: 26,RT,, | Performed by: RADIOLOGY

## 2023-01-20 PROCEDURE — 77065 DX MAMMO INCL CAD UNI: CPT | Mod: 26,RT,, | Performed by: RADIOLOGY

## 2023-01-20 PROCEDURE — 77065 MAMMO DIGITAL DIAGNOSTIC RIGHT WITH TOMO: ICD-10-PCS | Mod: 26,RT,, | Performed by: RADIOLOGY

## 2023-01-20 PROCEDURE — 77065 DX MAMMO INCL CAD UNI: CPT | Mod: TC,RT

## 2023-01-20 PROCEDURE — 77061 MAMMO DIGITAL DIAGNOSTIC RIGHT WITH TOMO: ICD-10-PCS | Mod: 26,RT,, | Performed by: RADIOLOGY

## 2023-01-20 PROCEDURE — 76642 US BREAST RIGHT LIMITED: ICD-10-PCS | Mod: 26,RT,, | Performed by: RADIOLOGY

## 2023-01-20 PROCEDURE — 76642 ULTRASOUND BREAST LIMITED: CPT | Mod: 26,RT,, | Performed by: RADIOLOGY

## 2023-03-17 ENCOUNTER — TELEPHONE (OUTPATIENT)
Dept: BEHAVIORAL HEALTH | Facility: CLINIC | Age: 63
End: 2023-03-17
Payer: COMMERCIAL

## 2023-03-17 ENCOUNTER — PATIENT MESSAGE (OUTPATIENT)
Dept: BEHAVIORAL HEALTH | Facility: CLINIC | Age: 63
End: 2023-03-17
Payer: COMMERCIAL

## 2023-03-17 ENCOUNTER — LAB VISIT (OUTPATIENT)
Dept: LAB | Facility: HOSPITAL | Age: 63
End: 2023-03-17
Attending: INTERNAL MEDICINE
Payer: COMMERCIAL

## 2023-03-17 ENCOUNTER — OFFICE VISIT (OUTPATIENT)
Dept: PRIMARY CARE CLINIC | Facility: CLINIC | Age: 63
End: 2023-03-17
Payer: COMMERCIAL

## 2023-03-17 VITALS
BODY MASS INDEX: 42.36 KG/M2 | DIASTOLIC BLOOD PRESSURE: 78 MMHG | TEMPERATURE: 98 F | HEART RATE: 77 BPM | WEIGHT: 230.19 LBS | SYSTOLIC BLOOD PRESSURE: 122 MMHG | RESPIRATION RATE: 20 BRPM | HEIGHT: 62 IN | OXYGEN SATURATION: 98 %

## 2023-03-17 DIAGNOSIS — F41.1 GAD (GENERALIZED ANXIETY DISORDER): ICD-10-CM

## 2023-03-17 DIAGNOSIS — Z00.00 ROUTINE MEDICAL EXAM: ICD-10-CM

## 2023-03-17 DIAGNOSIS — G47.33 OSA ON CPAP: ICD-10-CM

## 2023-03-17 DIAGNOSIS — Z23 NEED FOR PNEUMOCOCCAL VACCINE: ICD-10-CM

## 2023-03-17 DIAGNOSIS — Z13.820 OSTEOPOROSIS SCREENING: ICD-10-CM

## 2023-03-17 DIAGNOSIS — Z00.00 ROUTINE MEDICAL EXAM: Primary | ICD-10-CM

## 2023-03-17 DIAGNOSIS — E66.01 MORBID OBESITY WITH BMI OF 40.0-44.9, ADULT: ICD-10-CM

## 2023-03-17 DIAGNOSIS — F33.9 MAJOR DEPRESSION, RECURRENT, CHRONIC: ICD-10-CM

## 2023-03-17 DIAGNOSIS — J30.2 SEASONAL ALLERGIC RHINITIS, UNSPECIFIED TRIGGER: ICD-10-CM

## 2023-03-17 LAB
25(OH)D3+25(OH)D2 SERPL-MCNC: 26 NG/ML (ref 30–96)
ALBUMIN SERPL BCP-MCNC: 4 G/DL (ref 3.5–5.2)
ALP SERPL-CCNC: 66 U/L (ref 55–135)
ALT SERPL W/O P-5'-P-CCNC: 16 U/L (ref 10–44)
ANION GAP SERPL CALC-SCNC: 8 MMOL/L (ref 8–16)
AST SERPL-CCNC: 18 U/L (ref 10–40)
BILIRUB SERPL-MCNC: 0.9 MG/DL (ref 0.1–1)
BILIRUB UR QL STRIP: NEGATIVE
BUN SERPL-MCNC: 10 MG/DL (ref 8–23)
CALCIUM SERPL-MCNC: 9.8 MG/DL (ref 8.7–10.5)
CHLORIDE SERPL-SCNC: 103 MMOL/L (ref 95–110)
CHOLEST SERPL-MCNC: 255 MG/DL (ref 120–199)
CHOLEST/HDLC SERPL: 4.6 {RATIO} (ref 2–5)
CLARITY UR REFRACT.AUTO: CLEAR
CO2 SERPL-SCNC: 29 MMOL/L (ref 23–29)
COLOR UR AUTO: YELLOW
CREAT SERPL-MCNC: 0.8 MG/DL (ref 0.5–1.4)
ERYTHROCYTE [DISTWIDTH] IN BLOOD BY AUTOMATED COUNT: 13.2 % (ref 11.5–14.5)
EST. GFR  (NO RACE VARIABLE): >60 ML/MIN/1.73 M^2
ESTIMATED AVG GLUCOSE: 117 MG/DL (ref 68–131)
GLUCOSE SERPL-MCNC: 88 MG/DL (ref 70–110)
GLUCOSE UR QL STRIP: NEGATIVE
HBA1C MFR BLD: 5.7 % (ref 4–5.6)
HCT VFR BLD AUTO: 43.4 % (ref 37–48.5)
HDLC SERPL-MCNC: 56 MG/DL (ref 40–75)
HDLC SERPL: 22 % (ref 20–50)
HGB BLD-MCNC: 13.6 G/DL (ref 12–16)
HGB UR QL STRIP: NEGATIVE
KETONES UR QL STRIP: NEGATIVE
LDLC SERPL CALC-MCNC: 172 MG/DL (ref 63–159)
LEUKOCYTE ESTERASE UR QL STRIP: NEGATIVE
MCH RBC QN AUTO: 28.6 PG (ref 27–31)
MCHC RBC AUTO-ENTMCNC: 31.3 G/DL (ref 32–36)
MCV RBC AUTO: 91 FL (ref 82–98)
NITRITE UR QL STRIP: NEGATIVE
NONHDLC SERPL-MCNC: 199 MG/DL
PH UR STRIP: 6 [PH] (ref 5–8)
PLATELET # BLD AUTO: 334 K/UL (ref 150–450)
PMV BLD AUTO: 10.2 FL (ref 9.2–12.9)
POTASSIUM SERPL-SCNC: 4.4 MMOL/L (ref 3.5–5.1)
PROT SERPL-MCNC: 7.7 G/DL (ref 6–8.4)
PROT UR QL STRIP: NEGATIVE
RBC # BLD AUTO: 4.76 M/UL (ref 4–5.4)
SODIUM SERPL-SCNC: 140 MMOL/L (ref 136–145)
SP GR UR STRIP: 1.02 (ref 1–1.03)
TRIGL SERPL-MCNC: 135 MG/DL (ref 30–150)
URN SPEC COLLECT METH UR: NORMAL
WBC # BLD AUTO: 9.34 K/UL (ref 3.9–12.7)

## 2023-03-17 PROCEDURE — 99396 PREV VISIT EST AGE 40-64: CPT | Mod: S$GLB,,, | Performed by: INTERNAL MEDICINE

## 2023-03-17 PROCEDURE — 36415 COLL VENOUS BLD VENIPUNCTURE: CPT | Mod: PN | Performed by: INTERNAL MEDICINE

## 2023-03-17 PROCEDURE — 83036 HEMOGLOBIN GLYCOSYLATED A1C: CPT | Performed by: INTERNAL MEDICINE

## 2023-03-17 PROCEDURE — 81003 URINALYSIS AUTO W/O SCOPE: CPT | Performed by: INTERNAL MEDICINE

## 2023-03-17 PROCEDURE — 80061 LIPID PANEL: CPT | Performed by: INTERNAL MEDICINE

## 2023-03-17 PROCEDURE — 99999 PR PBB SHADOW E&M-EST. PATIENT-LVL V: CPT | Mod: PBBFAC,,, | Performed by: INTERNAL MEDICINE

## 2023-03-17 PROCEDURE — 82306 VITAMIN D 25 HYDROXY: CPT | Performed by: INTERNAL MEDICINE

## 2023-03-17 PROCEDURE — 80053 COMPREHEN METABOLIC PANEL: CPT | Performed by: INTERNAL MEDICINE

## 2023-03-17 PROCEDURE — 99396 PR PREVENTIVE VISIT,EST,40-64: ICD-10-PCS | Mod: S$GLB,,, | Performed by: INTERNAL MEDICINE

## 2023-03-17 PROCEDURE — 85027 COMPLETE CBC AUTOMATED: CPT | Performed by: INTERNAL MEDICINE

## 2023-03-17 PROCEDURE — 99999 PR PBB SHADOW E&M-EST. PATIENT-LVL V: ICD-10-PCS | Mod: PBBFAC,,, | Performed by: INTERNAL MEDICINE

## 2023-03-17 RX ORDER — ESTRADIOL 0.1 MG/G
CREAM VAGINAL
COMMUNITY
Start: 2022-10-29

## 2023-03-17 RX ORDER — ACYCLOVIR 400 MG/1
TABLET ORAL
COMMUNITY
Start: 2023-01-20

## 2023-03-17 RX ORDER — FLUTICASONE PROPIONATE 50 MCG
2 SPRAY, SUSPENSION (ML) NASAL DAILY
Qty: 16 G | Refills: 5 | Status: SHIPPED | OUTPATIENT
Start: 2023-03-17

## 2023-03-17 RX ORDER — LEVOCETIRIZINE DIHYDROCHLORIDE 5 MG/1
5 TABLET, FILM COATED ORAL NIGHTLY
Qty: 30 TABLET | Refills: 5 | Status: SHIPPED | OUTPATIENT
Start: 2023-03-17 | End: 2023-09-25 | Stop reason: SDUPTHER

## 2023-03-17 RX ORDER — SERTRALINE HYDROCHLORIDE 25 MG/1
25 TABLET, FILM COATED ORAL DAILY
Qty: 30 TABLET | Refills: 2 | Status: SHIPPED | OUTPATIENT
Start: 2023-03-17 | End: 2024-02-02 | Stop reason: SINTOL

## 2023-03-17 NOTE — PROGRESS NOTES
Behavioral Health Community Health Worker  Initial Assessment  Completed by: Bridgett Feliz     Date:  3/17/2023    Patient Enrollment in Behavioral Health Program:  Patient verbalized understanding of Behavioral Health Integration services to include:  Patient understands that CHW, LCSW, PharmD and consulting Psychiatrist are members of the care team working collaboratively with his/her primary care provider: Yes  Patient understands that activation of their [facility] patient portal account is required for accessing the full scope of team services: Yes  Patient understands that some counseling sessions may occur via video: Yes  Clinic visits with the psychiatrist may be subject to a co-pay based on your insurance: Yes  Patient consents to enroll in BHI program: Yes    Assessments     Single Item Health Literacy Scale:  How often do you need to have someone help you read instructions, pamphlets or other written material from your doctor or pharmacy?: Never    Promis 10:  Promis 10 Responses  In general, would you say your health is: Fair  In general, would you say your quality of life is: Fair  In general, how would you rate your physical health?: Fair  In general, how would you rate your mental health, including your mood and your ability to think?: Good  In general, how would you rate your satisfaction with your social activities and relationships?: Very good  In general, please rate how well you carry out your usual social activities and roles. (This includes activities at home, at work and in your community, and responsibilities as a parent, child, spouse, employee, friend, etc.): Fair  To what extent are you able to carry out your everyday physical activities such as walking, climbing stairs, carrying groceries, or moving a chair? : A little  How often have you been bothered by emotional problems such as feeling anxious, depressed or irritable?: Often  In the past 7 days, how would you rate your fatigue on  "average?: Severe  In the past 7 days, on a scale of 0 to 10 (where 0 is no pain and 10 is the worst pain imaginable) how would you rate your pain on average?: 5  Global Physical Health: 13  Global Mental health Score: 13    Depression PHQ:  PHQ9 3/17/2023   Total Score 15         Generalized Anxiety Disorder 7-Item Scale:  GAD7 3/17/2023   1. Feeling nervous, anxious, or on edge? 1   2. Not being able to stop or control worrying? 2   3. Worrying too much about different things? 1   4. Trouble relaxing? 1   5. Being so restless that it is hard to sit still? 1   6. Becoming easily annoyed or irritable? 2   7. Feeling afraid as if something awful might happen? 1   8. If you checked off any problems, how difficult have these problems made it for you to do your work, take care of things at home, or get along with other people? 1   JOHN-7 Score 9       History     Social History     Socioeconomic History    Marital status:    Tobacco Use    Smoking status: Every Day     Types: Vaping with nicotine    Smokeless tobacco: Never    Tobacco comments:     Former cigarette smoking age 16 to 56, 1/3 PPD.   Substance and Sexual Activity    Alcohol use: Yes     Comment: Occasional.    Drug use: Yes     Comment: CBD oil - joints anxiety    Sexual activity: Yes     Partners: Male   Social History Narrative    , 2 children - dtr local, son out of state.  for [employer].        Call Summary     Patient was referred to the BHI (Non-opioid) program by Primary Care Provider, Mirtha Harrell MD. CHW contacted Sandra Velazquez who reports depression and anxiety that limits her activities of daily living (ADLs).   Patient scored "15" on the PHQ9 and "9" on the JOHN 7. Based on these scores patient is eligible for the Behavioral Health Integration (Non-opioid) Program. CHW completed the intake and scheduled a virtual appointment for patient with  on 4/14/2023 at 1:00pm.          "

## 2023-03-19 NOTE — PROGRESS NOTES
Subjective:       Patient ID: Sandra Velazquez is a 63 y.o. female.    Chief Complaint: Establish Care and Annual Exam    Last seen by previous PCP here one year ago, that doctor has left the practice. Presents for transfer of care and annual physical.     PMH: .  GERD, Hiatal Hernia.  IBS - C.  BAR on CPAP.  Morbid Obesity.  Menopausal symptoms.  Depression with Anxiety, no Psychiatrist/Therapist.  Genital HSV.  [facility]   Seasonal Allergic Rhinitis.   GYN at [facility] . Mammogram normal . No BMD. Colonoscopy 3-4 times, last about 5 yrs ago - GI on [address]. No recent Eye exam. COVID vaccine dates obtained from [employer].    PSH:  BILATERAL SALPINGO-OOPHORECTOMY (BSO)  2004: TOTAL ABDOMINAL HYSTERECTOMY  CARPAL TUNNEL RELEASE, Bilateral.    Social: Former cigarette smoker age [age], 1/3 PPD. Currently vaping nicotine, occasional alcohol. , daughter is local, son out of state.  for LSBME.    FMH: Asthma and GI problems in mother. HTN, stroke, Crohn's in father. Crohn's in daughter. Colon cancer in uncle.    Allergies: Darvocet - hallucinations. Tramadol, Hydrocodone, Oxycodone - nausea and vomiting. Did not tolerate Duloxetine or Venlafaxine prescribed by PCP a year ago.     Medications: Meloxicam, Estrogen patch and cream, Nasacort prn, saline nasal spray, Vitamin D daily, B complex, Goody's prn HA. Not taking Propranolol prescribed for anxiety because it aggravated hot flashes.         Review of Systems   Constitutional:  Negative for activity change, appetite change, fatigue, fever and unexpected weight change.   HENT:  Positive for rhinorrhea and sneezing. Negative for nasal congestion, ear pain, hearing loss, sore throat, trouble swallowing and voice change.    Eyes:  Negative for pain and visual disturbance.   Respiratory:  Negative for cough, chest tightness, shortness of breath and wheezing.    Cardiovascular:   "Negative for chest pain, palpitations and leg swelling.   Gastrointestinal:  Positive for constipation. Negative for abdominal pain, blood in stool, diarrhea, nausea and vomiting.        Amitiza was ineffective.    Genitourinary:  Positive for hot flashes. Negative for dysuria, frequency and pelvic pain.   Musculoskeletal:  Positive for arthralgias. Negative for gait problem, joint swelling and myalgias.   Integumentary:  Negative for color change and rash.   Neurological:  Negative for dizziness, syncope, facial asymmetry, speech difficulty, weakness, numbness and headaches.   Hematological:  Negative for adenopathy. Does not bruise/bleed easily.   Psychiatric/Behavioral:  Positive for dysphoric mood and sleep disturbance. Negative for confusion and suicidal ideas. The patient is nervous/anxious.         Chronic insomnia x years due to "overactive mind". Depression and Anxiety x years, worsening.        Objective:      Vitals:    03/17/23 0722   BP: 122/78   BP Location: Right arm   Patient Position: Sitting   Pulse: 77   Resp: 20   Temp: 98.2 °F (36.8 °C)   TempSrc: Oral   SpO2: 98%   Weight: 104.4 kg (230 lb 2.6 oz)   Height: 5' 1.5" (1.562 m)   BMI=42.8  Physical Exam  Vitals reviewed.   Constitutional:       General: She is not in acute distress.     Appearance: She is well-developed. She is obese. She is not diaphoretic.   HENT:      Head: Normocephalic and atraumatic.      Right Ear: Tympanic membrane and ear canal normal.      Left Ear: Tympanic membrane and ear canal normal.      Nose: Nose normal. No congestion.      Mouth/Throat:      Mouth: Mucous membranes are moist.      Pharynx: Oropharynx is clear.   Eyes:      General: No scleral icterus.     Extraocular Movements: Extraocular movements intact.      Conjunctiva/sclera: Conjunctivae normal.      Right eye: Right conjunctiva is not injected.      Left eye: Left conjunctiva is not injected.      Pupils: Pupils are equal, round, and reactive to light. "   Neck:      Thyroid: No thyromegaly.      Vascular: No carotid bruit or JVD.   Cardiovascular:      Rate and Rhythm: Normal rate and regular rhythm.      Pulses: Normal pulses.      Heart sounds: Normal heart sounds. No murmur heard.    No friction rub. No gallop.   Pulmonary:      Effort: Pulmonary effort is normal. No respiratory distress.      Breath sounds: Normal breath sounds. No wheezing, rhonchi or rales.   Abdominal:      General: Bowel sounds are normal. There is no distension.      Palpations: Abdomen is soft. There is no mass.      Tenderness: There is no abdominal tenderness.   Musculoskeletal:         General: No tenderness or deformity. Normal range of motion.      Cervical back: Normal range of motion and neck supple.      Right lower leg: No edema.      Left lower leg: No edema.   Lymphadenopathy:      Cervical: No cervical adenopathy.   Skin:     General: Skin is warm and dry.      Coloration: Skin is not pale.      Findings: No erythema or rash.      Nails: There is no clubbing.   Neurological:      General: No focal deficit present.      Mental Status: She is alert and oriented to person, place, and time.      Cranial Nerves: No cranial nerve deficit.      Motor: No weakness or abnormal muscle tone.      Coordination: Coordination normal.      Gait: Gait normal.   Psychiatric:         Attention and Perception: Attention normal.         Mood and Affect: Mood is anxious. Affect is tearful.         Speech: Speech normal.         Behavior: Behavior normal.         Thought Content: Thought content normal.         Judgment: Judgment normal.       Assessment:       Problem List Items Addressed This Visit       BAR on CPAP    Morbid obesity with BMI of 40.0-44.9, adult    Relevant Orders    Hemoglobin A1C (Completed)     Other Visit Diagnoses       Routine medical exam    -  Primary    Relevant Orders    CBC Without Differential (Completed)    Comprehensive Metabolic Panel (Completed)    Lipid Panel  (Completed)    Vitamin D (Completed)    Urinalysis (Completed)    Major depression, recurrent, chronic        Relevant Medications    sertraline (ZOLOFT) 25 MG tablet    Other Relevant Orders    Ambulatory referral/consult to Primary Care Behavioral Health (Non-Opioids)    JOHN (generalized anxiety disorder)        Relevant Medications    sertraline (ZOLOFT) 25 MG tablet    Other Relevant Orders    Ambulatory referral/consult to Primary Care Behavioral Health (Non-Opioids)    Seasonal allergic rhinitis, unspecified trigger        Relevant Medications    levocetirizine (XYZAL) 5 MG tablet    fluticasone propionate (FLONASE) 50 mcg/actuation nasal spray    Osteoporosis screening        Relevant Orders    DXA Bone Density Axial Skeleton 1 or more sites    Need for pneumococcal vaccine                  Plan:       Routine medical exam  -     CBC Without Differential; Future; Expected date: 03/17/2023  -     Comprehensive Metabolic Panel; Future; Expected date: 03/17/2023  -     Lipid Panel; Future; Expected date: 03/17/2023  -     Vitamin D; Future; Expected date: 03/17/2023  -     Urinalysis    Major depression, recurrent, chronic  -     Start Sertraline (ZOLOFT) 25 MG tablet; Take 1 tablet (25 mg total) by mouth once daily. For depression/anxiety.  Dispense: 30 tablet; Refill: 2  -     Ambulatory referral/consult to Primary Care Behavioral Health (Non-Opioids); Future; Expected date: 03/24/2023    JOHN (generalized anxiety disorder)  -     Start Sertraline (ZOLOFT) 25 MG tablet; Take 1 tablet (25 mg total) by mouth once daily. For depression/anxiety.  Dispense: 30 tablet; Refill: 2  -     Ambulatory referral/consult to Primary Care Behavioral Health (Non-Opioids); Future; Expected date: 03/24/2023    Morbid obesity with BMI of 40.0-44.9, adult  -     Hemoglobin A1C; Future; Expected date: 03/17/2023    BAR on CPAP        -     encouraged compliance with nightly use, she does not need supplies ordered.    Seasonal  allergic rhinitis, unspecified trigger  -     levocetirizine (XYZAL) 5 MG tablet; Take 1 tablet (5 mg total) by mouth every evening. For allergies.  Disp: 30 tablet; Ref: 5  -     fluticasone propionate (FLONASE) 50 mcg/actuation; 2 sprays by Each Nostril route once daily.  Disp: 16 g; Refill: 5    Osteoporosis screening  -     DXA Bone Density Axial Skeleton 1 or more sites; Future; Expected date: 03/17/2023    Need for pneumococcal vaccine - Prevnar 20 recommended.   Flu shot and Shingrix declined.  Bivalent COVID booster also recommended.

## 2023-03-24 ENCOUNTER — TELEPHONE (OUTPATIENT)
Dept: BEHAVIORAL HEALTH | Facility: CLINIC | Age: 63
End: 2023-03-24
Payer: COMMERCIAL

## 2023-03-24 ENCOUNTER — PATIENT MESSAGE (OUTPATIENT)
Dept: BEHAVIORAL HEALTH | Facility: CLINIC | Age: 63
End: 2023-03-24
Payer: COMMERCIAL

## 2023-03-24 NOTE — PROGRESS NOTES
CHW reached out to pt to offer her an earlier virtual visit today at 1:45pm due to cancellation, unable to reschedule, will keep 4/14 appt. Resent virtual visit instruction to pt portal.

## 2023-04-13 ENCOUNTER — TELEPHONE (OUTPATIENT)
Dept: BEHAVIORAL HEALTH | Facility: CLINIC | Age: 63
End: 2023-04-13
Payer: COMMERCIAL

## 2023-04-13 ENCOUNTER — PATIENT MESSAGE (OUTPATIENT)
Dept: BEHAVIORAL HEALTH | Facility: CLINIC | Age: 63
End: 2023-04-13
Payer: COMMERCIAL

## 2023-04-13 NOTE — PROGRESS NOTES
CHW reached out to pt to remind her of virtual appointment with Suzie Gordon, PhD tomorrow. No answer, LVM of the appointment. Sent pt portal message reminder.

## 2023-04-14 ENCOUNTER — OFFICE VISIT (OUTPATIENT)
Dept: BEHAVIORAL HEALTH | Facility: CLINIC | Age: 63
End: 2023-04-14
Payer: COMMERCIAL

## 2023-04-14 DIAGNOSIS — F33.9 MAJOR DEPRESSION, RECURRENT, CHRONIC: ICD-10-CM

## 2023-04-14 DIAGNOSIS — F41.1 GAD (GENERALIZED ANXIETY DISORDER): ICD-10-CM

## 2023-04-14 PROCEDURE — 90791 PR PSYCHIATRIC DIAGNOSTIC EVALUATION: ICD-10-PCS | Mod: 95,,, | Performed by: PSYCHOLOGIST

## 2023-04-14 PROCEDURE — 90791 PSYCH DIAGNOSTIC EVALUATION: CPT | Mod: 95,,, | Performed by: PSYCHOLOGIST

## 2023-04-14 NOTE — PROGRESS NOTES
"Primary Care Behavioral Health Integration: Initial  Date:  4/14/2023  Referral Source:  Mirtha Harrell MD  Type of Visit:  Video Session  Length of Appointment: 45  The patient location is: [place], [place]  The chief complaint leading to consultation is: depression and anxiety    Visit type: audiovisual    Face to Face time with patient: 40  50 minutes of total time spent on the encounter, which includes face to face time and non-face to face time preparing to see the patient (eg, review of tests), Obtaining and/or reviewing separately obtained history, Documenting clinical information in the electronic or other health record, Independently interpreting results (not separately reported) and communicating results to the patient/family/caregiver, or Care coordination (not separately reported).     Each patient to whom he or she provides medical services by telemedicine is:  (1) informed of the relationship between the physician and patient and the respective role of any other health care provider with respect to management of the patient; and (2) notified that he or she may decline to receive medical services by telemedicine and may withdraw from such care at any time.    Notes:   History of Present Illness:  Sandra Velazquez, a 63 y.o. female with history of depression and anxiety referred by Mirtha Harrell MD.  Patient was seen, examined and chart was reviewed.      Met with patient.   Patient states, " there's been a number of things but more recently, probably over the past year, my anxiety is high and tolerance level for things is very low. I get very overwhelmed easily, especially when driving. I don't know if its everything going on in the city with crime. It's effecting my sleep, I'm overeating, doing all sorts of things that I know are not good for me. I want to exercise but I don't because I'm so worn out and tired. I'm worried that this will cause be greater health concerns."  When driving - heart " "races, jittery, feels like she has to constantly be on alert, constantly keeps "head on a swivel."    Feels like "a crying mess" frequently. Wonders if it is hormonal because she feels it's gotten worse after hysterectomy.  Feels down occasionally, doesn't have desire to do much outside of work, grocery shopping, and going home.     Wakes to go to the bathroom, has trouble falling back asleep. Sometimes can't fall asleep, takes melatonin. Sleeps about 5 hours at most, doesn't feel rested, can doze off  around 1-2p at work if not very busy     Current symptoms:  Depression: dysphoric mood, anhedonia, insomnia, hopelessness, fatigue, and difficulty concentrating.  Anxiety: excessive worrying, restlessness, and muscle tension.  Sleep: difficulty falling asleep and non-restful sleep.  Kaycee:  denies.  Psychosis: denies .    Risk assessment:  Patient reports no suicidal ideation  Patient reports no homicidal ideation  Patient reports no self-injurious behavior  Patient reports no violent behavior    Patient advised to call [phone] or present the the nearest ED if they experience suicidal or homicidal ideation, plan or intent.      Past Medical History:   Diagnosis Date    Anxiety     Arthritis     Constipation     Deafness in right ear     Depression     Genital HSV     GERD (gastroesophageal reflux disease)     Hemorrhoids     Hyperlipidemia     BAR on CPAP          Current Outpatient Medications:     acyclovir (ZOVIRAX) 400 MG tablet, , Disp: , Rfl:     cholecalciferol, vitamin D3, 10 mcg (400 unit) Cap, Take 1 capsule (400 Units total) by mouth once daily., Disp: , Rfl: 0    estradioL (ESTRACE) 0.01 % (0.1 mg/gram) vaginal cream, APPLY 1 APPLICATORFUL VAGINALLY AT BEDTIME EVERY SUNDAY, Disp: , Rfl:     estradiol 0.1 mg/24 hr td ptwk 0.1 mg/24 hr PTWK, Place 1 patch onto the skin every 7 days., Disp: , Rfl:     fluticasone propionate (FLONASE) 50 mcg/actuation nasal spray, 2 sprays (100 mcg total) by Each Nostril route " once daily., Disp: 16 g, Rfl: 5    levocetirizine (XYZAL) 5 MG tablet, Take 1 tablet (5 mg total) by mouth every evening. For allergies., Disp: 30 tablet, Rfl: 5    meloxicam 15 mg TbDL, Take 15 mg by mouth once daily., Disp: , Rfl:     sertraline (ZOLOFT) 25 MG tablet, Take 1 tablet (25 mg total) by mouth once daily. For depression/anxiety., Disp: 30 tablet, Rfl: 2    vitamin B complex-folic acid (B COMPLEX 1, WITH FOLIC ACID,) 0.4 mg Tab, Take 1 tablet by mouth once daily., Disp: , Rfl:     Psychiatric History:  Is the patient taking psychiatric medication: prescribed Zoloft but states she never started taking it.   They are not interested in medication changes.  Previous Psychiatric Outpatient Treatment:  No  Previous Psychiatric Hospitalizations:  No  Previous Suicide Attempts:  No  History of Trauma:  No  Access to a Firearm:  No    Substance Use History:  Tobacco/Nicotine:  Yes - vapes daily   Alcohol: social 1-2 drinks a month  Illicit Substances: No  Misuse of Prescription Medications:  No  Caffeine: Yes - coffee a couple of cups a week    Mental Status Exam  General Appearance:  unremarkable, age appropriate   Speech: normal tone, normal rate, normal pitch, normal volume      Level of Cooperation: cooperative      Thought Processes: normal and logical   Mood: euthymic      Thought Content: normal, no suicidality, no homicidality, delusions, or paranoia   Affect: congruent and appropriate   Orientation: Oriented x3   Memory: recent >  intact, remote >  intact   Attention Span & Concentration: intact   Fund of General Knowledge: intact and appropriate to age and level of education   Abstract Reasoning: Not assessed   Judgment & Insight: fair     Language  intact     Results of the PHQ8 4/14/2023   1. Little interest or pleasure in doing things More than half the days   2. Feeling down, depressed, or hopeless Several days   3. Trouble falling or staying asleep, or sleeping too much Several days   4. Feeling  tired or having little energy Several days   5. poor appetite or overeating Nearly every day   6. Feeling bad about yourself - or that you are a failure or have let yourself or your family down Several days   7. Trouble concentrating on things, such as reading the newspaper or watching television Several days   8. Moving or speaking so slowly that other people could have noticed? Or the opposite - being so fidgety or restless that you have been moving around a lot more than usual Not at all   Total Score  10       GAD7 4/14/2023 3/17/2023 3/11/2022   1. Feeling nervous, anxious, or on edge? 1 1 3   2. Not being able to stop or control worrying? 1 2 1   3. Worrying too much about different things? 1 1 1   4. Trouble relaxing? 1 1 1   5. Being so restless that it is hard to sit still? 0 1 1   6. Becoming easily annoyed or irritable? 1 2 1   7. Feeling afraid as if something awful might happen? 1 1 1   8. If you checked off any problems, how difficult have these problems made it for you to do your work, take care of things at home, or get along with other people? - 1 1   JOHN-7 Score 6 9 9     Provisional Diagnosis:  1. Major depression, recurrent, chronic    2. JOHN (generalized anxiety disorder)         Treatment Goals and Plan: Initial appointment focused on gathering history, identifying treatment goals and developing a treatment plan.      Patient discussed wanting to focus on anxiety management and improving her sleep. She discussed financial concerns and reported feeling unable to commit to regular appointments as a result. Discussed the possibility of working with a psychology resident at [facility], which would be free of charge. Patient stated she would think about it and message me letting me know her decision.    Return to Clinic: No follow-ups on file.

## 2023-04-21 ENCOUNTER — PATIENT MESSAGE (OUTPATIENT)
Dept: ADMINISTRATIVE | Facility: HOSPITAL | Age: 63
End: 2023-04-21
Payer: COMMERCIAL

## 2023-09-25 ENCOUNTER — TELEPHONE (OUTPATIENT)
Dept: PRIMARY CARE CLINIC | Facility: CLINIC | Age: 63
End: 2023-09-25
Payer: COMMERCIAL

## 2023-09-25 DIAGNOSIS — J30.2 SEASONAL ALLERGIC RHINITIS, UNSPECIFIED TRIGGER: ICD-10-CM

## 2023-09-25 DIAGNOSIS — E78.5 HYPERLIPIDEMIA, UNSPECIFIED HYPERLIPIDEMIA TYPE: Primary | ICD-10-CM

## 2023-09-25 RX ORDER — LEVOCETIRIZINE DIHYDROCHLORIDE 5 MG/1
5 TABLET, FILM COATED ORAL NIGHTLY
Qty: 30 TABLET | Refills: 2 | Status: SHIPPED | OUTPATIENT
Start: 2023-09-25 | End: 2024-01-02

## 2023-09-25 NOTE — TELEPHONE ENCOUNTER
No care due was identified.  [facility] Embedded Care Due Messages. Reference number: [id number].   9/25/2023 3:36:36 PM CDT

## 2023-09-25 NOTE — TELEPHONE ENCOUNTER
Pt state she will be establishing with another PCP she is asking for a refill on Levocetirizine 5 mg

## 2023-09-25 NOTE — TELEPHONE ENCOUNTER
Please advise I recommend six month follow up cholesterol that was high in March. Fasting lab at her convenience. Is she taking the Sertraline started in March, she cancelled her follow up in May, no calls for refills.

## 2023-10-06 ENCOUNTER — NURSE TRIAGE (OUTPATIENT)
Dept: ADMINISTRATIVE | Facility: CLINIC | Age: 63
End: 2023-10-06
Payer: COMMERCIAL

## 2023-10-06 NOTE — TELEPHONE ENCOUNTER
Patient c/o body aches, runny nose, cough, and chills. Negative covid test on yesterday. States that she was exposed 4 days ago. Advised per protocol to speak with her PCP today. Patient has several questions regarding medications and a second test. Will route message for PCP to f/u with the patient. Advised the patient to call back with any further questions or if symptoms worsen.      Reason for Disposition   COVID-19 infection suspected by caller or triager and mild symptoms (cough, fever, or others) and negative COVID-19 rapid test    Additional Information   Negative: SEVERE difficulty breathing (e.g., struggling for each breath, speaks in single words)   Negative: Difficult to awaken or acting confused (e.g., disoriented, slurred speech)   Negative: Bluish (or gray) lips or face now   Negative: Shock suspected (e.g., cold/pale/clammy skin, too weak to stand, low BP, rapid pulse)   Negative: Sounds like a life-threatening emergency to the triager   Negative: SEVERE or constant chest pain or pressure  (Exception: Mild central chest pain, present only when coughing.)   Negative: MODERATE difficulty breathing (e.g., speaks in phrases, SOB even at rest, pulse 100-120)   Negative: Headache and stiff neck (can't touch chin to chest)   Negative: Oxygen level (e.g., pulse oximetry) 90% or lower   Negative: Chest pain or pressure  (Exception: MILD central chest pain, present only when coughing.)   Negative: Drinking very little and dehydration suspected (e.g., no urine > 12 hours, very dry mouth, very lightheaded)   Negative: Patient sounds very sick or weak to the triager   Negative: MILD difficulty breathing (e.g., minimal/no SOB at rest, SOB with walking, pulse <100)   Negative: Fever > 103 F (39.4 C)   Negative: Fever > 101 F (38.3 C) and over 60 years of age   Negative: Fever > 100.0 F (37.8 C) and bedridden (e.g., CVA, chronic illness, recovering from surgery)   Negative: HIGH RISK patient (e.g., weak immune  system, age > 64 years, obesity with BMI of 30 or higher, pregnant, chronic lung disease or other chronic medical condition) and COVID symptoms (e.g., cough, fever)  (Exceptions: Already seen by doctor or NP/PA and no new or worsening symptoms.)   Negative: HIGH RISK patient and influenza is widespread in the community and ONE OR MORE respiratory symptoms: cough, sore throat, runny or stuffy nose   Negative: HIGH RISK patient and influenza exposure within the last 7 days and ONE OR MORE respiratory symptoms: cough, sore throat, runny or stuffy nose   Negative: Oxygen level (e.g., pulse oximetry) 91 to 94%    Protocols used: Coronavirus (COVID-19) Diagnosed or [id number]

## 2023-10-09 ENCOUNTER — OFFICE VISIT (OUTPATIENT)
Dept: PRIMARY CARE CLINIC | Facility: CLINIC | Age: 63
End: 2023-10-09
Payer: COMMERCIAL

## 2023-10-09 ENCOUNTER — TELEPHONE (OUTPATIENT)
Dept: PRIMARY CARE CLINIC | Facility: CLINIC | Age: 63
End: 2023-10-09
Payer: COMMERCIAL

## 2023-10-09 DIAGNOSIS — R09.81 SINUS CONGESTION: Primary | ICD-10-CM

## 2023-10-09 DIAGNOSIS — R05.9 COUGH, UNSPECIFIED TYPE: ICD-10-CM

## 2023-10-09 PROCEDURE — 99213 PR OFFICE/OUTPT VISIT, EST, LEVL III, 20-29 MIN: ICD-10-PCS | Mod: 95,,, | Performed by: NURSE PRACTITIONER

## 2023-10-09 PROCEDURE — 99213 OFFICE O/P EST LOW 20 MIN: CPT | Mod: 95,,, | Performed by: NURSE PRACTITIONER

## 2023-10-09 RX ORDER — PROMETHAZINE HYDROCHLORIDE AND DEXTROMETHORPHAN HYDROBROMIDE 6.25; 15 MG/5ML; MG/5ML
5 SYRUP ORAL EVERY 4 HOURS PRN
Qty: 118 ML | Refills: 0 | Status: SHIPPED | OUTPATIENT
Start: 2023-10-09 | End: 2023-10-19

## 2023-10-09 RX ORDER — GUAIFENESIN 600 MG/1
1200 TABLET, EXTENDED RELEASE ORAL 2 TIMES DAILY
Qty: 40 TABLET | Refills: 0 | Status: SHIPPED | OUTPATIENT
Start: 2023-10-09 | End: 2023-10-19

## 2023-10-09 RX ORDER — PREDNISONE 20 MG/1
20 TABLET ORAL DAILY
Qty: 5 TABLET | Refills: 0 | Status: SHIPPED | OUTPATIENT
Start: 2023-10-09 | End: 2024-02-02

## 2023-10-09 NOTE — PROGRESS NOTES
[facility] Note    Chief Complaint      Chief Complaint   Patient presents with    Cough    Sinus Problem       History of Present Illness      Sandra Velazquez is a 63 y.o. female who presents today via virtual visit for   Chief Complaint   Patient presents with    Cough    Sinus Problem         Patient presents to clinic via virtual visit to discuss sinus congestion, cough, headache, myalgias which started 5 days ago.  She tested positive for COVID at home.  She does have some chest muscle pain from coughing.  She denies any shortness a breath.         Review of Systems   HENT:  Positive for congestion and sinus pain.    Respiratory:  Positive for cough.    All 12 systems otherwise negative.       Family History:  family history includes Asthma in her mother; Bell's palsy in her father; Colon cancer in her maternal uncle; Crohn's disease in her daughter and father; GI problems in her mother; Hyperlipidemia in her sister; Hypertension in her father; Scoliosis in her sister; Stroke (age of onset: 65) in her father.   Family history was reviewed with patient.     Medications:  Outpatient Encounter Medications as of 10/9/2023   Medication Sig Note Dispense Refill    acyclovir (ZOVIRAX) 400 MG tablet  3/17/2023: #90      cholecalciferol, vitamin D3, 10 mcg (400 unit) Cap Take 1 capsule (400 Units total) by mouth once daily.   0    estradioL (ESTRACE) 0.01 % (0.1 mg/gram) vaginal cream APPLY 1 APPLICATORFUL VAGINALLY AT BEDTIME EVERY SUNDAY       estradiol 0.1 mg/24 hr td ptwk 0.1 mg/24 hr PTWK Place 1 patch onto the skin every 7 days.       fluticasone propionate (FLONASE) 50 mcg/actuation nasal spray 2 sprays (100 mcg total) by Each Nostril route once daily.  16 g 5    guaiFENesin (MUCINEX) 600 mg 12 hr tablet Take 2 tablets (1,200 mg total) by mouth 2 (two) times daily. for 10 days  40 tablet 0    levocetirizine (XYZAL) 5 MG tablet Take 1 tablet (5 mg total) by mouth every evening. For allergies.  30  tablet 2    meloxicam 15 mg TbDL Take 15 mg by mouth once daily.       predniSONE (DELTASONE) 20 MG tablet Take 1 tablet (20 mg total) by mouth once daily.  5 tablet 0    promethazine-dextromethorphan (PROMETHAZINE-DM) 6.25-15 mg/5 mL Syrp Take 5 mLs by mouth every 4 (four) hours as needed (cough).  118 mL 0    sertraline (ZOLOFT) 25 MG tablet Take 1 tablet (25 mg total) by mouth once daily. For depression/anxiety.  30 tablet 2    vitamin B complex-folic acid (B COMPLEX 1, WITH FOLIC ACID,) 0.4 mg Tab Take 1 tablet by mouth once daily.        No facility-administered encounter medications on file as of 10/9/2023.       Allergies:  Review of patient's allergies indicates:   Allergen Reactions    Darvocet a500 [propoxyphene n-acetaminophen] Hallucinations    Oxycodone-acetaminophen Nausea And Vomiting    Oxycodone-aspirin Nausea And Vomiting    Tramadol Nausea And Vomiting       Health Maintenance:  Health Maintenance   Topic Date Due    TETANUS VACCINE  Never done    Colorectal Cancer Screening  Never done    Shingles Vaccine (2 of 2) 11/17/2023    Mammogram  01/20/2024    Lipid Panel  03/17/2028    Hepatitis C Screening  Completed     Health Maintenance Topics with due status: Not Due       Topic Last Completion Date    Mammogram 01/20/2023    Hemoglobin A1c (Diabetic Prevention Screening) 03/17/2023    Lipid Panel 03/17/2023    Shingles Vaccine 09/22/2023       Physical Exam       ]        Assessment/Plan     Sandra Velazquez is a 63 y.o.female with:    Sinus congestion  -     predniSONE (DELTASONE) 20 MG tablet; Take 1 tablet (20 mg total) by mouth once daily.  Dispense: 5 tablet; Refill: 0  -     guaiFENesin (MUCINEX) 600 mg 12 hr tablet; Take 2 tablets (1,200 mg total) by mouth 2 (two) times daily. for 10 days  Dispense: 40 tablet; Refill: 0    Cough, unspecified type  -     promethazine-dextromethorphan (PROMETHAZINE-DM) 6.25-15 mg/5 mL Syrp; Take 5 mLs by mouth every 4 (four) hours as needed (cough).  Dispense:  118 mL; Refill: 0      Hydrate, rest, Mucinex Expectorant as directed for thinning out the mucus; or MucinexDM if with significant cough and mucus.  Zyrtec, Xyzal, Allegra or Claritin. (Would lean towards Allegra which may be a bit more effective than claritin and will not cause sedation as Xyzal or Zyrtec may.)  Nasal saline spray such as [place] brand as needed.  Flonase or Nasonex nasal spray after the nasal saline.  Warm salt water gargles and warm liquids may help soothe a sore throat better than cool liquids.  Tylenol as directed as needed for fever, body aches, headaches.   All are OTC  Most of all, stay well-hydrated.     Quarantine for 5 days after symptoms started. If, for example, your symptoms started on a Sunday, your quarantine will be over starting Saturday. In other words, you could be around others on a Saturday in this example. Still wear a mask and be very careful around others for 5 days after your quarantine is over.      As above, continue current medications and maintain follow up with specialists.  Return to clinic as needed.    Greater than 50% of this time was spent face to face via virtual visit with patient.  All questions were answered to patient's satisfaction.    The patient location is: home  The chief complaint leading to consultation is: sinus congestion, cough    Visit type: audiovisual    Face to Face time with patient:   10 minutes of total time spent on the encounter, which includes face to face time and non-face to face time preparing to see the patient (eg, review of tests), Obtaining and/or reviewing separately obtained history, Documenting clinical information in the electronic or other health record, Independently interpreting results (not separately reported) and communicating results to the patient/family/caregiver, or Care coordination (not separately reported).         Each patient to whom he or she provides medical services by telemedicine is:  (1) informed of the  relationship between the physician and patient and the respective role of any other health care provider with respect to management of the patient; and (2) notified that he or she may decline to receive medical services by telemedicine and may withdraw from such care at any time.       Karen L Spencer, NP-C [facility]    Answers submitted by the patient for this visit:  Review of Systems Questionnaire (Submitted on 10/9/2023)  activity change: No  unexpected weight change: No  neck pain: No  hearing loss: No  rhinorrhea: Yes  trouble swallowing: No  eye discharge: No  visual disturbance: No  chest tightness: No  wheezing: No  chest pain: Yes  palpitations: No  blood in stool: No  constipation: Yes  vomiting: No  diarrhea: No  polydipsia: No  polyuria: No  difficulty urinating: No  hematuria: No  menstrual problem: No  dysuria: No  joint swelling: No  arthralgias: Yes  headaches: Yes  weakness: No  confusion: No  dysphoric mood: No

## 2023-10-09 NOTE — LETTER
October 9, 2023      [facility]  [address]  [facility] [address]  Phone: [phone]  Fax: [phone]       Patient: Sandra Velazquez   YOB: 1960  Date of Visit: 10/09/2023    To Whom It May Concern:    Lizabeth Velazquez  was at [facility] on 10/09/2023. She may return to work/school on 10/16/23 with no restrictions. If you have any questions or concerns, or if I can be of further assistance, please do not hesitate to contact me.    Sincerely,          Reny Aguilar, NP

## 2023-10-09 NOTE — TELEPHONE ENCOUNTER
----- Message from Heidi Martin sent at 10/9/2023  8:16 AM CDT -----  Type:  Needs Medical Advice    Who Called: pt  Symptoms (please be specific): pt thinks she is Covid positive pt has taken 2 at home tests but she stated that the lines are very faint   How long has patient had these symptoms:  10/05/23 with body aches/and a slight temp and headaches    Would the patient rather a call back or a response via [facility]? call  Best Call Back Number: [phone]  Additional Information:

## 2023-10-10 ENCOUNTER — PATIENT MESSAGE (OUTPATIENT)
Dept: PRIMARY CARE CLINIC | Facility: CLINIC | Age: 63
End: 2023-10-10
Payer: COMMERCIAL

## 2023-12-31 DIAGNOSIS — J30.2 SEASONAL ALLERGIC RHINITIS, UNSPECIFIED TRIGGER: ICD-10-CM

## 2023-12-31 NOTE — TELEPHONE ENCOUNTER
No care due was identified.  [facility] Embedded Care Due Messages. Reference number: [id number].   12/31/2023 7:23:24 AM CST

## 2024-01-02 RX ORDER — LEVOCETIRIZINE DIHYDROCHLORIDE 5 MG/1
TABLET, FILM COATED ORAL
Qty: 90 TABLET | Refills: 0 | Status: SHIPPED | OUTPATIENT
Start: 2024-01-02

## 2024-01-02 NOTE — TELEPHONE ENCOUNTER
Refill Decision Note   Sandra Velazquez  is requesting a refill authorization.  Brief Assessment and Rationale for Refill:  Approve     Medication Therapy Plan:         Comments:     Note composed:5:24 PM 01/02/2024             Appointments     Last Visit   3/17/2023 Mirtha Harrell MD   Next Visit   Visit date not found Mirtha Harrell MD

## 2024-01-31 ENCOUNTER — HOSPITAL ENCOUNTER (OUTPATIENT)
Dept: RADIOLOGY | Facility: HOSPITAL | Age: 64
Discharge: HOME | End: 2024-01-31
Attending: INTERNAL MEDICINE
Payer: COMMERCIAL

## 2024-01-31 DIAGNOSIS — Z12.31 ENCOUNTER FOR SCREENING MAMMOGRAM FOR BREAST CANCER: ICD-10-CM

## 2024-01-31 PROCEDURE — 77063 BREAST TOMOSYNTHESIS BI: CPT | Mod: 26,,, | Performed by: RADIOLOGY

## 2024-01-31 PROCEDURE — 77067 SCR MAMMO BI INCL CAD: CPT | Mod: TC

## 2024-01-31 PROCEDURE — 77067 SCR MAMMO BI INCL CAD: CPT | Mod: 26,,, | Performed by: RADIOLOGY

## 2024-02-02 ENCOUNTER — PATIENT MESSAGE (OUTPATIENT)
Dept: BEHAVIORAL HEALTH | Facility: CLINIC | Age: 64
End: 2024-02-02
Payer: COMMERCIAL

## 2024-02-02 ENCOUNTER — OFFICE VISIT (OUTPATIENT)
Dept: PRIMARY CARE CLINIC | Facility: CLINIC | Age: 64
End: 2024-02-02
Payer: COMMERCIAL

## 2024-02-02 ENCOUNTER — HOSPITAL ENCOUNTER (OUTPATIENT)
Dept: RADIOLOGY | Facility: HOSPITAL | Age: 64
Discharge: HOME | End: 2024-02-02
Attending: INTERNAL MEDICINE
Payer: COMMERCIAL

## 2024-02-02 VITALS
SYSTOLIC BLOOD PRESSURE: 110 MMHG | HEIGHT: 62 IN | WEIGHT: 222.69 LBS | DIASTOLIC BLOOD PRESSURE: 60 MMHG | BODY MASS INDEX: 40.98 KG/M2 | OXYGEN SATURATION: 96 % | TEMPERATURE: 99 F | HEART RATE: 85 BPM

## 2024-02-02 DIAGNOSIS — Z12.12 SCREENING FOR COLORECTAL CANCER: ICD-10-CM

## 2024-02-02 DIAGNOSIS — Z86.010 HISTORY OF COLON POLYPS: ICD-10-CM

## 2024-02-02 DIAGNOSIS — Z29.11 NEED FOR RSV VACCINATION: ICD-10-CM

## 2024-02-02 DIAGNOSIS — K59.09 CHRONIC CONSTIPATION: ICD-10-CM

## 2024-02-02 DIAGNOSIS — F41.1 GAD (GENERALIZED ANXIETY DISORDER): ICD-10-CM

## 2024-02-02 DIAGNOSIS — G47.33 OSA ON CPAP: ICD-10-CM

## 2024-02-02 DIAGNOSIS — Z23 NEED FOR COVID-19 VACCINE: ICD-10-CM

## 2024-02-02 DIAGNOSIS — Z13.820 OSTEOPOROSIS SCREENING: ICD-10-CM

## 2024-02-02 DIAGNOSIS — Z23 INFLUENZA VACCINE NEEDED: ICD-10-CM

## 2024-02-02 DIAGNOSIS — R73.03 PRE-DIABETES: ICD-10-CM

## 2024-02-02 DIAGNOSIS — R35.0 URINARY FREQUENCY: ICD-10-CM

## 2024-02-02 DIAGNOSIS — F33.9 MAJOR DEPRESSION, RECURRENT, CHRONIC: ICD-10-CM

## 2024-02-02 DIAGNOSIS — Z12.11 SCREENING FOR COLORECTAL CANCER: ICD-10-CM

## 2024-02-02 DIAGNOSIS — E66.01 CLASS 3 SEVERE OBESITY DUE TO EXCESS CALORIES WITHOUT SERIOUS COMORBIDITY WITH BODY MASS INDEX (BMI) OF 40.0 TO 44.9 IN ADULT: ICD-10-CM

## 2024-02-02 DIAGNOSIS — N95.1 MENOPAUSAL SYMPTOMS: ICD-10-CM

## 2024-02-02 DIAGNOSIS — E78.5 HYPERLIPIDEMIA, UNSPECIFIED HYPERLIPIDEMIA TYPE: Primary | ICD-10-CM

## 2024-02-02 DIAGNOSIS — Z12.31 ENCOUNTER FOR SCREENING MAMMOGRAM FOR BREAST CANCER: ICD-10-CM

## 2024-02-02 PROCEDURE — 99999 PR PBB SHADOW E&M-EST. PATIENT-LVL V: CPT | Mod: PBBFAC,,, | Performed by: INTERNAL MEDICINE

## 2024-02-02 PROCEDURE — 99215 OFFICE O/P EST HI 40 MIN: CPT | Mod: S$GLB,,, | Performed by: INTERNAL MEDICINE

## 2024-02-02 PROCEDURE — 74018 RADEX ABDOMEN 1 VIEW: CPT | Mod: TC,PN

## 2024-02-02 PROCEDURE — 74018 RADEX ABDOMEN 1 VIEW: CPT | Mod: 26,,, | Performed by: RADIOLOGY

## 2024-02-02 NOTE — PROGRESS NOTES
Subjective     Patient ID: Sandra Velazquez is a 63 y.o. female.    Chief Complaint: Follow-up    Seen once to establish care/for annual physical 3/17/23. Complained of chronic depression with anxiety and insomnia, unable to tolerate meds prescribed by previous PCP including Cymbalta and Effexor. Started low dose Zoloft 25mg daily, and referred to Behavioral Health at that time. She had one session with [facility], then stopped. She discontinued Sertraline after 4-5 doses due to multiple side effects, similar to previous meds. She is just returning, with multiple complaints including persistent insomnia which may be secondary to uncontrolled depression and anxiety. And severe chronic constipation that is worsening, bowel movement 1-2 times a week with the use of laxatives and enemas. Colonoscopies previously done by Teres[facility], last one about six years ago, history of polyps - due to update screening. She used Amitiza with success in the past, would like to try medication like this again.      PMH: .  Hyperlipidemia, HDL 56,  .   Pre-Diabetes, HbA1c 5.7% .   GERD, Hiatal Hernia.  IBS - C.  BAR on CPAP.  Morbid Obesity. TSH normal 0.651 .   Menopausal symptoms.  Depression with Anxiety, no Psychiatrist/Therapist.  Genital HSV.  Osteoarthritis Knees - Orthopedist in [facility].   Seasonal Allergic Rhinitis.   H/O Colon Polyps (pathology unknown).  GYN at [facility] . Mammogram normal , and two days ago - report pending. No BMD. Colonoscopy 3-4 times, last about 6 yrs ago - GI on [address]. No recent Eye exam. Vaccines reviewed, no current Flu or COVID, Tdap or RSV.    PSH:  BILATERAL SALPINGO-OOPHORECTOMY (BSO)  2004: TOTAL ABDOMINAL HYSTERECTOMY  CARPAL TUNNEL RELEASE, Bilateral.    Social: Former cigarette smoker age [age], 1/3 PPD. Currently vaping nicotine, occasional alcohol. , daughter is local, son out of state.  for [employer].    FMH: Asthma  and GI problems in mother. HTN, stroke, Crohn's in father. Crohn's in daughter. Colon cancer in uncle.    Allergies: Darvocet - hallucinations. Tramadol, Hydrocodone, Oxycodone - nausea and vomiting. Did not tolerate Duloxetine or Venlafaxine prescribed by PCP a year ago.     Medications: Meloxicam from an outside Orthopedist, Estrogen patch and cream, Flonase, Acyclovir prn, Vitamin D, Melatonin.          Review of Systems   Constitutional:  Negative for activity change, appetite change, fatigue, fever and unexpected weight change.   HENT:  Negative for nasal congestion, ear pain, hearing loss, rhinorrhea, sneezing, sore throat, trouble swallowing and voice change.    Eyes:  Negative for pain, discharge and visual disturbance.   Respiratory:  Positive for apnea. Negative for cough, chest tightness, shortness of breath and wheezing.         Compliant with nightly CPAP, new supplies obtained about two months ago, feels like it's not working any more.    Cardiovascular:  Negative for chest pain, palpitations and leg swelling.   Gastrointestinal:  Positive for constipation. Negative for abdominal pain, blood in stool, diarrhea, nausea and vomiting.   Endocrine: Negative for polydipsia and polyuria.   Genitourinary:  Positive for frequency, hot flashes, nocturia and vaginal dryness. Negative for difficulty urinating, dysuria, hematuria, pelvic pain, urgency and vaginal bleeding.        Not getting relief from her hormone supplements like she used to.    Musculoskeletal:  Positive for arthralgias. Negative for gait problem, joint swelling, myalgias and neck pain.   Integumentary:  Negative for color change and rash.        Scalp hair falling out.    Neurological:  Negative for dizziness, syncope, facial asymmetry, speech difficulty, weakness, numbness and headaches.   Hematological:  Negative for adenopathy. Does not bruise/bleed easily.   Psychiatric/Behavioral:  Positive for dysphoric mood and sleep disturbance.  "Negative for agitation, confusion and suicidal ideas. The patient is nervous/anxious.         She is reluctant to try more medication for mood.           Objective   Vitals:    02/02/24 1316   BP: 110/60   Pulse: 85   Temp: 98.6 °F (37 °C)   SpO2: 96%   Weight: 101 kg (222 lb 11.2 oz)            From 230.    Height: 5' 1.5" (1.562 m)   BMI=41.4  Physical Exam  Vitals reviewed.   Constitutional:       General: She is not in acute distress.     Appearance: She is well-developed. She is obese. She is not diaphoretic.   HENT:      Head: Normocephalic and atraumatic.      Right Ear: Tympanic membrane and ear canal normal.      Left Ear: Tympanic membrane and ear canal normal.      Nose: Nose normal. No congestion.      Mouth/Throat:      Mouth: Mucous membranes are moist.      Pharynx: Oropharynx is clear.   Eyes:      General: No scleral icterus.     Extraocular Movements: Extraocular movements intact.      Conjunctiva/sclera: Conjunctivae normal.      Right eye: Right conjunctiva is not injected.      Left eye: Left conjunctiva is not injected.      Pupils: Pupils are equal, round, and reactive to light.   Neck:      Thyroid: No thyromegaly.      Vascular: No carotid bruit or JVD.   Cardiovascular:      Rate and Rhythm: Normal rate and regular rhythm.      Pulses: Normal pulses.      Heart sounds: Normal heart sounds. No murmur heard.     No friction rub. No gallop.   Pulmonary:      Effort: Pulmonary effort is normal. No respiratory distress.      Breath sounds: Normal breath sounds. No wheezing, rhonchi or rales.   Abdominal:      General: Bowel sounds are normal. There is no distension.      Palpations: Abdomen is soft. There is no mass.      Tenderness: There is no abdominal tenderness.   Musculoskeletal:         General: No tenderness or deformity. Normal range of motion.      Cervical back: Normal range of motion and neck supple.      Right lower leg: No edema.      Left lower leg: No edema.   Lymphadenopathy:    "   Cervical: No cervical adenopathy.   Skin:     General: Skin is warm and dry.      Coloration: Skin is not pale.      Findings: No erythema or rash.      Nails: There is no clubbing.      Comments: No patchy alopecia, still has fairly good scalp coverage.   Neurological:      General: No focal deficit present.      Mental Status: She is alert and oriented to person, place, and time.      Cranial Nerves: No cranial nerve deficit.      Motor: No weakness or abnormal muscle tone.      Coordination: Coordination normal.      Gait: Gait normal.   Psychiatric:         Attention and Perception: Attention normal.         Mood and Affect: Affect normal. Mood is depressed.         Speech: Speech normal.         Behavior: Behavior normal.         Thought Content: Thought content normal.          Assessment and Plan     1. Hyperlipidemia, unspecified hyperlipidemia type - no med prescribed yet.   -     Comprehensive Metabolic Panel; Future; Expected date: 02/02/2024  -     Lipid Panel; Future; Expected date: 02/02/2024    2. Pre-diabetes  -     CBC Without Differential; Future; Expected date: 02/02/2024  -     Hemoglobin A1C; Future; Expected date: 02/02/2024    3. Major depression, recurrent, chronic  -     Ambulatory referral/consult to Primary Care Behavioral Health (Non-Opioids); Future; Expected date: 02/09/2024    4. JOHN (generalized anxiety disorder)  -     Ambulatory referral/consult to Primary Care Behavioral Health (Non-Opioids); Future; Expected date: 02/09/2024    5. Class 3 severe obesity due to excess calories without serious comorbidity with body mass index (BMI) of 40.0 to 44.9 in adult        -     high fiber diet with plenty hydration, and regular exercise are encouraged.     6. BAR on CPAP  -     Ambulatory referral/consult to Sleep Disorders; Future; Expected date: 02/09/2024    7. Menopausal symptoms  -     Ambulatory referral/consult to Gynecology; Future; Expected date: 02/09/2024    8. Urinary  frequency  -     Urinalysis, Reflex to Urine Culture Urine, Clean Catch    9. Chronic constipation  -     TSH; Future; Expected date: 02/02/2024  -     linaCLOtide (LINZESS) 72 mcg Cap capsule; Take 1 capsule (72 mcg total) by mouth before breakfast.  Dispense: 30 capsule; Refill: 1  -     X-Ray Abdomen AP 1 View; Future; Expected date: 02/02/2024    10. History of colon polyps  -     Ambulatory referral/consult to Endo Procedure ; Future; Expected date: 02/03/2024    11. Screening for colorectal cancer  -     Ambulatory referral/consult to Endo Procedure ; Future; Expected date: 02/03/2024    12. Encounter for screening mammogram for breast cancer        -     Mammogram I ordered was done two days ago, awaiting results.     13. Osteoporosis screening        -     DEXA scan previously ordered needs to be scheduled.    14. Influenza vaccine needed - Flu shot today.     15. Need for COVID-19 vaccine - deferred to a later date.     16. Need for RSV vaccination - deferred to a later date.        No follow-ups on file.

## 2024-02-06 ENCOUNTER — PATIENT MESSAGE (OUTPATIENT)
Dept: PRIMARY CARE CLINIC | Facility: CLINIC | Age: 64
End: 2024-02-06
Payer: COMMERCIAL

## 2024-02-07 ENCOUNTER — PATIENT MESSAGE (OUTPATIENT)
Dept: BEHAVIORAL HEALTH | Facility: CLINIC | Age: 64
End: 2024-02-07
Payer: COMMERCIAL

## 2024-02-07 ENCOUNTER — TELEPHONE (OUTPATIENT)
Dept: BEHAVIORAL HEALTH | Facility: CLINIC | Age: 64
End: 2024-02-07
Payer: COMMERCIAL

## 2024-02-07 ENCOUNTER — PATIENT MESSAGE (OUTPATIENT)
Dept: PRIMARY CARE CLINIC | Facility: CLINIC | Age: 64
End: 2024-02-07
Payer: COMMERCIAL

## 2024-02-07 NOTE — PROGRESS NOTES
Contacted patient no answer left VM. Sent intake assessment to patient portal requested patient pls call to schedule an appointment.

## 2024-02-09 ENCOUNTER — LAB VISIT (OUTPATIENT)
Dept: LAB | Facility: HOSPITAL | Age: 64
End: 2024-02-09
Attending: INTERNAL MEDICINE
Payer: COMMERCIAL

## 2024-02-09 DIAGNOSIS — R73.03 PRE-DIABETES: ICD-10-CM

## 2024-02-09 DIAGNOSIS — E78.5 HYPERLIPIDEMIA, UNSPECIFIED HYPERLIPIDEMIA TYPE: ICD-10-CM

## 2024-02-09 DIAGNOSIS — K59.09 CHRONIC CONSTIPATION: ICD-10-CM

## 2024-02-09 LAB
ALBUMIN SERPL BCP-MCNC: 3.7 G/DL (ref 3.5–5.2)
ALP SERPL-CCNC: 62 U/L (ref 55–135)
ALT SERPL W/O P-5'-P-CCNC: 13 U/L (ref 10–44)
ANION GAP SERPL CALC-SCNC: 7 MMOL/L (ref 8–16)
AST SERPL-CCNC: 15 U/L (ref 10–40)
BILIRUB SERPL-MCNC: 0.7 MG/DL (ref 0.1–1)
BUN SERPL-MCNC: 7 MG/DL (ref 8–23)
CALCIUM SERPL-MCNC: 10.1 MG/DL (ref 8.7–10.5)
CHLORIDE SERPL-SCNC: 106 MMOL/L (ref 95–110)
CHOLEST SERPL-MCNC: 236 MG/DL (ref 120–199)
CHOLEST/HDLC SERPL: 4.7 {RATIO} (ref 2–5)
CO2 SERPL-SCNC: 26 MMOL/L (ref 23–29)
CREAT SERPL-MCNC: 0.7 MG/DL (ref 0.5–1.4)
ERYTHROCYTE [DISTWIDTH] IN BLOOD BY AUTOMATED COUNT: 13.2 % (ref 11.5–14.5)
EST. GFR  (NO RACE VARIABLE): >60 ML/MIN/1.73 M^2
ESTIMATED AVG GLUCOSE: 114 MG/DL (ref 68–131)
GLUCOSE SERPL-MCNC: 83 MG/DL (ref 70–110)
HBA1C MFR BLD: 5.6 % (ref 4–5.6)
HCT VFR BLD AUTO: 41.2 % (ref 37–48.5)
HDLC SERPL-MCNC: 50 MG/DL (ref 40–75)
HDLC SERPL: 21.2 % (ref 20–50)
HGB BLD-MCNC: 13 G/DL (ref 12–16)
LDLC SERPL CALC-MCNC: 158 MG/DL (ref 63–159)
MCH RBC QN AUTO: 28.5 PG (ref 27–31)
MCHC RBC AUTO-ENTMCNC: 31.6 G/DL (ref 32–36)
MCV RBC AUTO: 90 FL (ref 82–98)
NONHDLC SERPL-MCNC: 186 MG/DL
PLATELET # BLD AUTO: 320 K/UL (ref 150–450)
PMV BLD AUTO: 10.2 FL (ref 9.2–12.9)
POTASSIUM SERPL-SCNC: 4.3 MMOL/L (ref 3.5–5.1)
PROT SERPL-MCNC: 7.2 G/DL (ref 6–8.4)
RBC # BLD AUTO: 4.56 M/UL (ref 4–5.4)
SODIUM SERPL-SCNC: 139 MMOL/L (ref 136–145)
TRIGL SERPL-MCNC: 140 MG/DL (ref 30–150)
TSH SERPL DL<=0.005 MIU/L-ACNC: 0.92 UIU/ML (ref 0.4–4)
WBC # BLD AUTO: 8.61 K/UL (ref 3.9–12.7)

## 2024-02-09 PROCEDURE — 83036 HEMOGLOBIN GLYCOSYLATED A1C: CPT | Performed by: INTERNAL MEDICINE

## 2024-02-09 PROCEDURE — 36415 COLL VENOUS BLD VENIPUNCTURE: CPT | Mod: PN | Performed by: INTERNAL MEDICINE

## 2024-02-09 PROCEDURE — 85027 COMPLETE CBC AUTOMATED: CPT | Performed by: INTERNAL MEDICINE

## 2024-02-09 PROCEDURE — 80053 COMPREHEN METABOLIC PANEL: CPT | Performed by: INTERNAL MEDICINE

## 2024-02-09 PROCEDURE — 80061 LIPID PANEL: CPT | Performed by: INTERNAL MEDICINE

## 2024-02-09 PROCEDURE — 84443 ASSAY THYROID STIM HORMONE: CPT | Performed by: INTERNAL MEDICINE

## 2024-02-11 ENCOUNTER — PATIENT MESSAGE (OUTPATIENT)
Dept: PRIMARY CARE CLINIC | Facility: CLINIC | Age: 64
End: 2024-02-11
Payer: COMMERCIAL

## 2024-02-12 ENCOUNTER — PATIENT MESSAGE (OUTPATIENT)
Dept: PRIMARY CARE CLINIC | Facility: CLINIC | Age: 64
End: 2024-02-12
Payer: COMMERCIAL

## 2024-02-12 ENCOUNTER — TELEPHONE (OUTPATIENT)
Dept: PRIMARY CARE CLINIC | Facility: CLINIC | Age: 64
End: 2024-02-12
Payer: COMMERCIAL

## 2024-02-12 NOTE — TELEPHONE ENCOUNTER
Spoke with patient and clarified that her urine was not collected while she was in clinic on 2/2/24.  I'm not sure why it shows as done on her AVS because the MA did not collect one and she will not be billed for a service that we did not provide.  Thankful for my call and clarification.

## 2024-02-12 NOTE — TELEPHONE ENCOUNTER
----- Message from Marbella Jasso sent at 2/12/2024  1:41 PM CST -----  Contact: Patient @ [phone]  [email or web address]     Patient is calling for Medical Advice regarding:Patient is upset . Patient on her chart shows she had urine on 02/2/2024 . Patient state she did not have urine test. Patient was only done lab. Patient state that she does not want this billed to insurance    How long has patient had these symptoms:    Pharmacy name and phone#:    Would like response via [facility]:  call     Comments:

## 2024-03-13 ENCOUNTER — CLINICAL SUPPORT (OUTPATIENT)
Dept: ENDOSCOPY | Facility: HOSPITAL | Age: 64
End: 2024-03-13
Attending: INTERNAL MEDICINE
Payer: COMMERCIAL

## 2024-03-13 ENCOUNTER — TELEPHONE (OUTPATIENT)
Dept: ENDOSCOPY | Facility: HOSPITAL | Age: 64
End: 2024-03-13

## 2024-03-13 VITALS — HEIGHT: 62 IN | BODY MASS INDEX: 40.98 KG/M2 | WEIGHT: 222.69 LBS

## 2024-03-13 DIAGNOSIS — Z12.11 SCREENING FOR COLORECTAL CANCER: Primary | ICD-10-CM

## 2024-03-13 DIAGNOSIS — Z12.12 SCREENING FOR COLORECTAL CANCER: Primary | ICD-10-CM

## 2024-03-13 DIAGNOSIS — Z86.010 HISTORY OF COLON POLYPS: ICD-10-CM

## 2024-03-13 DIAGNOSIS — Z12.12 SCREENING FOR COLORECTAL CANCER: ICD-10-CM

## 2024-03-13 DIAGNOSIS — Z12.11 SCREENING FOR COLORECTAL CANCER: ICD-10-CM

## 2024-03-13 RX ORDER — POLYETHYLENE GLYCOL 3350, SODIUM SULFATE ANHYDROUS, SODIUM BICARBONATE, SODIUM CHLORIDE, POTASSIUM CHLORIDE 236; 22.74; 6.74; 5.86; 2.97 G/4L; G/4L; G/4L; G/4L; G/4L
4 POWDER, FOR SOLUTION ORAL ONCE
Qty: 4000 ML | Refills: 0 | Status: SHIPPED | OUTPATIENT
Start: 2024-03-13 | End: 2024-03-13

## 2024-03-13 NOTE — PLAN OF CARE
Spoke to pt to schedule procedure(s) Colonoscopy       Physician to perform procedure(s) Dr. PAOLA Victor  Date of Procedure (s) 3/20/24  Arrival Time 10:00 AM  Time of Procedure(s) 11:00 AM   Location of Procedure(s) [facility]  Type of Rx Prep sent to patient: PEG  Instructions provided to patient via [facility]    Patient was informed on the following information and verbalized understanding. Screening questionnaire reviewed with patient and complete. If procedure requires anesthesia, a responsible adult needs to be present to accompany the patient home, patient cannot drive after receiving anesthesia. Appointment details are tentative, especially check-in time. Patient will receive a prep-op call 7 days prior to confirm check-in time for procedure. If applicable the patient should contact their pharmacy to verify Rx for procedure prep is ready for pick-up. Patient was advised to call the scheduling department at [phone] if pharmacy states no Rx is available. Patient was advised to call the endoscopy scheduling department if any questions or concerns arise.      [facility] Scheduling Department

## 2024-03-13 NOTE — TELEPHONE ENCOUNTER
Spoke to pt to schedule procedure(s) Colonoscopy       Physician to perform procedure(s) Dr. PAOLA Victor  Date of Procedure (s) 3/20/24  Arrival Time 10:00 AM  Time of Procedure(s) 11:00 AM   Location of Procedure(s) [facility] [address] Floor  Type of Rx Prep sent to patient: PEG  Instructions provided to patient via [facility]    Patient was informed on the following information and verbalized understanding. Screening questionnaire reviewed with patient and complete. If procedure requires anesthesia, a responsible adult needs to be present to accompany the patient home, patient cannot drive after receiving anesthesia. Appointment details are tentative, especially check-in time. Patient will receive a prep-op call 7 days prior to confirm check-in time for procedure. If applicable the patient should contact their pharmacy to verify Rx for procedure prep is ready for pick-up. Patient was advised to call the scheduling department at [phone] if pharmacy states no Rx is available. Patient was advised to call the endoscopy scheduling department if any questions or concerns arise.      [facility] Scheduling Department

## 2024-03-13 NOTE — PLAN OF CARE
Spoke to pt to schedule procedure(s) Colonoscopy       Physician to perform procedure(s) Dr. PAOLA Victor  Date of Procedure (s) 3/20/24  Arrival Time 10:00 AM  Time of Procedure(s) 11:00 AM   Location of Procedure(s) [facility]  Type of Rx Prep sent to patient: PEG  Instructions provided to patient via [facility]    Patient was informed on the following information and verbalized understanding. Screening questionnaire reviewed with patient and complete. If procedure requires anesthesia, a responsible adult needs to be present to accompany the patient home, patient cannot drive after receiving anesthesia. Appointment details are tentative, especially check-in time. Patient will receive a prep-op call 7 days prior to confirm check-in time for procedure. If applicable the patient should contact their pharmacy to verify Rx for procedure prep is ready for pick-up. Patient was advised to call the scheduling department at [phone] if pharmacy states no Rx is available. Patient was advised to call the endoscopy scheduling department if any questions or concerns arise.      [facility] Scheduling Department

## 2024-03-19 ENCOUNTER — ANESTHESIA EVENT (OUTPATIENT)
Dept: ENDOSCOPY | Facility: HOSPITAL | Age: 64
End: 2024-03-19
Payer: COMMERCIAL

## 2024-03-19 ENCOUNTER — TELEPHONE (OUTPATIENT)
Dept: ENDOSCOPY | Facility: HOSPITAL | Age: 64
End: 2024-03-19
Payer: COMMERCIAL

## 2024-03-19 NOTE — H&P
Short Stay Endoscopy History and Physical    PCP - Mirtha Harrell MD  Referring Physician - Mirtha Harrell MD  [address] [place] [address]    Procedure - Colonoscopy  ASA - per anesthesia  Mallampati - per anesthesia  History of Anesthesia problems - no  Family history Anesthesia problems -  no   Plan of anesthesia - General    HPI  64 y.o. female  Reason for procedure:   Screening for colorectal cancer [Z12.11, Z12.12]  History of colon polyps [Z86.010]     States she had cscope 9 years ago. Does not think she has polyps   Has constipation, hemorrhoids with intermittent rectal bleeding     ROS:  Constitutional: No fevers, chills, No weight loss  CV: No chest pain  Pulm: No cough, No shortness of breath  GI: see HPI    Medical History:  has a past medical history of Anxiety, Arthritis, Constipation, Deafness in right ear, Depression, Genital HSV, GERD (gastroesophageal reflux disease), Hemorrhoids, Hyperlipidemia, BAR on CPAP, and Pre-diabetes (02/02/2024).    Surgical History:  has a past surgical history that includes Total abdominal hysterectomy (2004); Carpal tunnel release (Bilateral); and Bilateral salpingo-oophorectomy (BSO).    Family History: family history includes Asthma in her mother; Bell's palsy in her father; Colon cancer in her maternal uncle; Crohn's disease in her daughter and father; GI problems in her mother; Hyperlipidemia in her sister; Hypertension in her father; Scoliosis in her sister; Stroke (age of onset: 65) in her father..    Social History:  reports that she has been smoking vaping with nicotine. She has never been exposed to tobacco smoke. She has never used smokeless tobacco. She reports current alcohol use. She reports current drug use.    Review of patient's allergies indicates:   Allergen Reactions    Darvocet a500 [propoxyphene n-acetaminophen] Hallucinations    Oxycodone-acetaminophen Nausea And Vomiting    Oxycodone-aspirin Nausea And  Vomiting    Tramadol Nausea And Vomiting       Medications:   Medications Prior to Admission   Medication Sig Dispense Refill Last Dose    acyclovir (ZOVIRAX) 400 MG tablet        cholecalciferol, vitamin D3, 10 mcg (400 unit) Cap Take 1 capsule (400 Units total) by mouth once daily.  0     estradioL (ESTRACE) 0.01 % (0.1 mg/gram) vaginal cream APPLY 1 APPLICATORFUL VAGINALLY AT BEDTIME EVERY SUNDAY       estradiol 0.1 mg/24 hr td ptwk 0.1 mg/24 hr PTWK Place 1 patch onto the skin every 7 days.       fluticasone propionate (FLONASE) 50 mcg/actuation nasal spray 2 sprays (100 mcg total) by Each Nostril route once daily. 16 g 5     levocetirizine (XYZAL) 5 MG tablet TAKE 1 TABLET BY MOUTH EVERY DAY IN THE EVENING FOR ALLERGIES 90 tablet 0     linaCLOtide (LINZESS) 72 mcg Cap capsule Take 1 capsule (72 mcg total) by mouth before breakfast. 30 capsule 1     meloxicam 15 mg TbDL Take 15 mg by mouth once daily.       vitamin B complex-folic acid (B COMPLEX 1, WITH FOLIC ACID,) 0.4 mg Tab Take 1 tablet by mouth once daily.          Physical Exam:    Vital Signs: There were no vitals filed for this visit.    General Appearance: Well appearing in no acute distress  Abdomen: Soft, non tender, non distended with normal bowel sounds, no masses    Labs:  Lab Results   Component Value Date    WBC 8.61 02/09/2024    HGB 13.0 02/09/2024    HCT 41.2 02/09/2024     02/09/2024    CHOL 236 (H) 02/09/2024    TRIG 140 02/09/2024    HDL 50 02/09/2024    ALT 13 02/09/2024    AST 15 02/09/2024     02/09/2024    K 4.3 02/09/2024     02/09/2024    CREATININE 0.7 02/09/2024    BUN 7 (L) 02/09/2024    CO2 26 02/09/2024    TSH 0.922 02/09/2024    HGBA1C 5.6 02/09/2024       I have explained the risks and benefits of this endoscopic procedure to the patient including but not limited to bleeding, inflammation, infection, perforation, and death.    Assessment/Plan:     CRC Screening     - Proceed with colonoscopy     Nguyen  MD Valente  Gastroenterology   [facility]

## 2024-03-19 NOTE — ANESTHESIA PREPROCEDURE EVALUATION
03/19/2024  Sandra Velazquez is a 64 y.o., female.       Patient Active Problem List   Diagnosis    Herpes    Gastroesophageal reflux disease without esophagitis    Hot flashes    Irritable bowel syndrome with constipation    Hiatal hernia    BAR on CPAP    Anxiety associated with depression    Morbid obesity with BMI of 40.0-44.9, adult    Hyperlipidemia    Pre-diabetes       Past Medical History:   Diagnosis Date    Anxiety     Arthritis     Constipation     Deafness in right ear     Depression     Genital HSV     GERD (gastroesophageal reflux disease)     Hemorrhoids     Hyperlipidemia     BAR on CPAP     Pre-diabetes 02/02/2024       ECHO: No results found for this or any previous visit.      There is no height or weight on file to calculate BMI.    Tobacco Use: High Risk (3/19/2024)    Patient History     Smoking Tobacco Use: Every Day     Smokeless Tobacco Use: Never     Passive Exposure: Never       Social History     Substance and Sexual Activity   Drug Use Yes    Comment: CBD oil - joints anxiety        Alcohol Use: Patient Declined (1/31/2024)    AUDIT-C     Frequency of Alcohol Consumption: Patient declined     Average Number of Drinks: Patient declined     Frequency of Binge Drinking: Patient declined       Review of patient's allergies indicates:   Allergen Reactions    Darvocet a500 [propoxyphene n-acetaminophen] Hallucinations    Oxycodone-acetaminophen Nausea And Vomiting    Oxycodone-aspirin Nausea And Vomiting    Tramadol Nausea And Vomiting         Airway:  No value filed.    Pre-op Assessment    I have reviewed the Patient Summary Reports.    I have reviewed the NPO Status.   I have reviewed the Medications.     Review of Systems  Anesthesia Hx:             Denies Family Hx of Anesthesia complications.    Denies Personal Hx of Anesthesia complications.                     Hematology/Oncology:  Hematology Normal   Oncology Normal                                   EENT/Dental:  EENT/Dental Normal           Cardiovascular:  Cardiovascular Normal                                            Pulmonary:        Sleep Apnea                Renal/:  Renal/ Normal                 Hepatic/GI:    Hiatal Hernia, GERD             Musculoskeletal:  Musculoskeletal Normal                Neurological:    Neuromuscular Disease,                                   Endocrine:  Endocrine Normal          Morbid Obesity / BMI > 40  Dermatological:  Skin Normal    Psych:  Psychiatric History                     Anesthesia Plan  Type of Anesthesia, risks & benefits discussed:    Anesthesia Type: Gen Natural Airway  Intra-op Monitoring Plan: Standard ASA Monitors  Post Op Pain Control Plan: multimodal analgesia  Induction:  IV  Informed Consent: Informed consent signed with the Patient and all parties understand the risks and agree with anesthesia plan.  All questions answered. Patient consented to blood products? No  ASA Score: 3  Day of Surgery Review of History & Physical: H&P Update referred to the surgeon/provider.    Ready For Surgery From Anesthesia Perspective.     .

## 2024-03-20 ENCOUNTER — ANESTHESIA (OUTPATIENT)
Dept: ENDOSCOPY | Facility: HOSPITAL | Age: 64
End: 2024-03-20
Payer: COMMERCIAL

## 2024-03-20 ENCOUNTER — HOSPITAL ENCOUNTER (OUTPATIENT)
Facility: HOSPITAL | Age: 64
Discharge: HOME | End: 2024-03-20
Attending: STUDENT IN AN ORGANIZED HEALTH CARE EDUCATION/TRAINING PROGRAM | Admitting: STUDENT IN AN ORGANIZED HEALTH CARE EDUCATION/TRAINING PROGRAM
Payer: COMMERCIAL

## 2024-03-20 VITALS
OXYGEN SATURATION: 99 % | SYSTOLIC BLOOD PRESSURE: 112 MMHG | TEMPERATURE: 98 F | BODY MASS INDEX: 41.54 KG/M2 | DIASTOLIC BLOOD PRESSURE: 59 MMHG | HEART RATE: 69 BPM | HEIGHT: 61 IN | RESPIRATION RATE: 16 BRPM | WEIGHT: 220 LBS

## 2024-03-20 DIAGNOSIS — K59.09 CHRONIC CONSTIPATION: ICD-10-CM

## 2024-03-20 DIAGNOSIS — Z12.11 COLON CANCER SCREENING: Primary | ICD-10-CM

## 2024-03-20 PROCEDURE — 63600175 PHARM REV CODE 636 W HCPCS: Performed by: NURSE ANESTHETIST, CERTIFIED REGISTERED

## 2024-03-20 PROCEDURE — 45385 COLONOSCOPY W/LESION REMOVAL: CPT | Mod: PT | Performed by: STUDENT IN AN ORGANIZED HEALTH CARE EDUCATION/TRAINING PROGRAM

## 2024-03-20 PROCEDURE — 88305 TISSUE EXAM BY PATHOLOGIST: CPT | Mod: 26,,, | Performed by: STUDENT IN AN ORGANIZED HEALTH CARE EDUCATION/TRAINING PROGRAM

## 2024-03-20 PROCEDURE — 27201089 HC SNARE, DISP (ANY): Performed by: STUDENT IN AN ORGANIZED HEALTH CARE EDUCATION/TRAINING PROGRAM

## 2024-03-20 PROCEDURE — 88305 TISSUE EXAM BY PATHOLOGIST: CPT | Mod: 59 | Performed by: STUDENT IN AN ORGANIZED HEALTH CARE EDUCATION/TRAINING PROGRAM

## 2024-03-20 PROCEDURE — 27201012 HC FORCEPS, HOT/COLD, DISP: Performed by: STUDENT IN AN ORGANIZED HEALTH CARE EDUCATION/TRAINING PROGRAM

## 2024-03-20 PROCEDURE — 99900035 HC TECH TIME PER 15 MIN (STAT)

## 2024-03-20 PROCEDURE — 45380 COLONOSCOPY AND BIOPSY: CPT | Mod: PT,59 | Performed by: STUDENT IN AN ORGANIZED HEALTH CARE EDUCATION/TRAINING PROGRAM

## 2024-03-20 PROCEDURE — 45385 COLONOSCOPY W/LESION REMOVAL: CPT | Mod: 33,,, | Performed by: STUDENT IN AN ORGANIZED HEALTH CARE EDUCATION/TRAINING PROGRAM

## 2024-03-20 PROCEDURE — D9220A PRA ANESTHESIA: Mod: 33,,, | Performed by: NURSE ANESTHETIST, CERTIFIED REGISTERED

## 2024-03-20 PROCEDURE — 37000009 HC ANESTHESIA EA ADD 15 MINS: Performed by: STUDENT IN AN ORGANIZED HEALTH CARE EDUCATION/TRAINING PROGRAM

## 2024-03-20 PROCEDURE — 25000003 PHARM REV CODE 250: Performed by: NURSE ANESTHETIST, CERTIFIED REGISTERED

## 2024-03-20 PROCEDURE — 37000008 HC ANESTHESIA 1ST 15 MINUTES: Performed by: STUDENT IN AN ORGANIZED HEALTH CARE EDUCATION/TRAINING PROGRAM

## 2024-03-20 PROCEDURE — 45380 COLONOSCOPY AND BIOPSY: CPT | Mod: 33,59,, | Performed by: STUDENT IN AN ORGANIZED HEALTH CARE EDUCATION/TRAINING PROGRAM

## 2024-03-20 PROCEDURE — 94761 N-INVAS EAR/PLS OXIMETRY MLT: CPT

## 2024-03-20 RX ORDER — PROPOFOL 10 MG/ML
VIAL (ML) INTRAVENOUS
Status: DISCONTINUED | OUTPATIENT
Start: 2024-03-20 | End: 2024-03-20

## 2024-03-20 RX ORDER — SODIUM CHLORIDE 9 MG/ML
INJECTION, SOLUTION INTRAVENOUS CONTINUOUS
Status: DISCONTINUED | OUTPATIENT
Start: 2024-03-20 | End: 2024-03-20 | Stop reason: HOSPADM

## 2024-03-20 RX ORDER — LIDOCAINE HYDROCHLORIDE 20 MG/ML
INJECTION INTRAVENOUS
Status: DISCONTINUED | OUTPATIENT
Start: 2024-03-20 | End: 2024-03-20

## 2024-03-20 RX ORDER — HYDROCORTISONE 25 MG/G
CREAM TOPICAL 2 TIMES DAILY
Qty: 20 G | Refills: 0 | Status: SHIPPED | OUTPATIENT
Start: 2024-03-20 | End: 2024-03-27

## 2024-03-20 RX ADMIN — SODIUM CHLORIDE: 0.9 INJECTION, SOLUTION INTRAVENOUS at 10:03

## 2024-03-20 RX ADMIN — LIDOCAINE HYDROCHLORIDE 100 MG: 20 INJECTION INTRAVENOUS at 10:03

## 2024-03-20 RX ADMIN — PROPOFOL 100 MG: 10 INJECTION, EMULSION INTRAVENOUS at 10:03

## 2024-03-20 NOTE — PROVATION PATIENT INSTRUCTIONS
Discharge Summary/Instructions after an Endoscopic Procedure  Patient Name: Sandra Velazquez  Patient MRN: [id number]  Patient YOB: 1960 Wednesday, March 20, 2024  Nguyen Victor MD  Dear patient,  As a result of recent federal legislation (The Federal Cures Act), you may   receive lab or pathology results from your procedure in your [facility]   account before your physician is able to contact you. Your physician or   their representative will relay the results to you with their   recommendations at their soonest availability.  Thank you,  RESTRICTIONS:  During your procedure today, you received medications for sedation.  These   medications may affect your judgment, balance and coordination.  Therefore,   for 24 hours, you have the following restrictions:   - DO NOT drive a car, operate machinery, make legal/financial decisions,   sign important papers or drink alcohol.    ACTIVITY:  Today: no heavy lifting, straining or running due to procedural   sedation/anesthesia.  The following day: return to full activity including work.  DIET:  Eat and drink normally unless instructed otherwise.     TREATMENT FOR COMMON SIDE EFFECTS:  - Mild abdominal pain, nausea, belching, bloating or excessive gas:  rest,   eat lightly and use a heating pad.  - Sore Throat: treat with throat lozenges and/or gargle with warm salt   water.  - Because air was used during the procedure, expelling large amounts of air   from your rectum or belching is normal.  - If a bowel prep was taken, you may not have a bowel movement for 1-3 days.    This is normal.  SYMPTOMS TO WATCH FOR AND REPORT TO YOUR PHYSICIAN:  1. Abdominal pain or bloating, other than gas cramps.  2. Chest pain.  3. Back pain.  4. Signs of infection such as: chills or fever occurring within 24 hours   after the procedure.  5. Rectal bleeding, which would show as bright red, maroon, or black stools.   (A tablespoon of blood from the rectum is not serious, especially if    hemorrhoids are present.)  6. Vomiting.  7. Weakness or dizziness.  GO DIRECTLY TO THE NEAREST EMERGENCY ROOM IF YOU HAVE ANY OF THE FOLLOWING:      Difficulty breathing              Chills and/or fever over 101 F   Persistent vomiting and/or vomiting blood   Severe abdominal pain   Severe chest pain   Black, tarry stools   Bleeding- more than one tablespoon   Any other symptom or condition that you feel may need urgent attention  Your doctor recommends these additional instructions:  If any biopsies were taken, your doctors clinic will contact you in 1 to 2   weeks with any results.  - Discharge patient to home (ambulatory).   - Resume regular diet.   - Continue present medications.   - Await pathology results.   - Repeat colonoscopy in 7 years for surveillance.  For questions, problems or results please call your physician - Nguyen Victor MD at Work:  [phone].  [facility], EMERGENCY ROOM PHONE NUMBER: [phone]  IF A COMPLICATION OR EMERGENCY SITUATION ARISES AND YOU ARE UNABLE TO REACH   YOUR PHYSICIAN - GO DIRECTLY TO THE EMERGENCY ROOM.  Nguyen Victor MD  3/20/2024 10:36:56 AM  This report has been verified and signed electronically.  Dear patient,  As a result of recent federal legislation (The Federal Cures Act), you may   receive lab or pathology results from your procedure in your [facility]   account before your physician is able to contact you. Your physician or   their representative will relay the results to you with their   recommendations at their soonest availability.  Thank you,  PROVATION

## 2024-03-20 NOTE — TRANSFER OF CARE
"Anesthesia Transfer of Care Note    Patient: Sandra Velazquez    Procedure(s) Performed: Procedure(s) (LRB):  COLONOSCOPY (N/A)    Patient location: PACU    Anesthesia Type: general    Transport from OR: Transported from OR on 6-10 L/min O2 by face mask with adequate spontaneous ventilation    Post pain: adequate analgesia    Post assessment: no apparent anesthetic complications    Post vital signs: stable    Level of consciousness: awake    Nausea/Vomiting: no nausea/vomiting    Complications: none    Transfer of care protocol was followed      Last vitals: Visit Vitals  BP (!) 100/58 (BP Location: Left arm, Patient Position: Lying)   Pulse 71   Temp 36.7 °C (98.1 °F) (Tympanic)   Resp 16   Ht 5' 1" (1.549 m)   Wt 99.8 kg (220 lb)   SpO2 97%   Breastfeeding No   BMI 41.57 kg/m²     "

## 2024-03-20 NOTE — ANESTHESIA POSTPROCEDURE EVALUATION
Anesthesia Post Evaluation    Patient: Sandra Velazquez    Procedure(s) Performed: Procedure(s) (LRB):  COLONOSCOPY (N/A)    Final Anesthesia Type: general      Patient location during evaluation: PACU  Patient participation: Yes- Able to Participate  Level of consciousness: awake and alert  Post-procedure vital signs: reviewed and stable  Pain management: adequate  Airway patency: patent    PONV status at discharge: No PONV  Anesthetic complications: no      Cardiovascular status: blood pressure returned to baseline  Respiratory status: unassisted  Hydration status: euvolemic                Vitals Value Taken Time   /59 03/20/24 1052   Temp 36.7 03/20/24 1118   Pulse 69 03/20/24 1052   Resp 16 03/20/24 1052   SpO2 99 % 03/20/24 1052         Event Time   Out of Recovery 10:50:30         Pain/Larissa Score: Larissa Score: 10 (3/20/2024 10:44 AM)

## 2024-03-27 LAB
FINAL PATHOLOGIC DIAGNOSIS: NORMAL
GROSS: NORMAL
Lab: NORMAL

## 2024-05-13 DIAGNOSIS — J30.2 SEASONAL ALLERGIC RHINITIS, UNSPECIFIED TRIGGER: ICD-10-CM

## 2024-05-13 RX ORDER — LEVOCETIRIZINE DIHYDROCHLORIDE 5 MG/1
5 TABLET, FILM COATED ORAL NIGHTLY
Qty: 90 TABLET | Refills: 1 | Status: SHIPPED | OUTPATIENT
Start: 2024-05-13

## 2024-05-13 NOTE — TELEPHONE ENCOUNTER
No care due was identified.  [facility] Embedded Care Due Messages. Reference number: [id number].   5/13/2024 2:58:24 PM CDT

## 2024-08-30 ENCOUNTER — LAB VISIT (OUTPATIENT)
Dept: LAB | Facility: HOSPITAL | Age: 64
End: 2024-08-30
Attending: INTERNAL MEDICINE
Payer: COMMERCIAL

## 2024-08-30 ENCOUNTER — OFFICE VISIT (OUTPATIENT)
Dept: PRIMARY CARE CLINIC | Facility: CLINIC | Age: 64
End: 2024-08-30
Payer: COMMERCIAL

## 2024-08-30 VITALS
WEIGHT: 224.63 LBS | HEART RATE: 76 BPM | OXYGEN SATURATION: 100 % | BODY MASS INDEX: 42.41 KG/M2 | SYSTOLIC BLOOD PRESSURE: 132 MMHG | HEIGHT: 61 IN | DIASTOLIC BLOOD PRESSURE: 84 MMHG

## 2024-08-30 DIAGNOSIS — M25.50 ARTHRALGIA, UNSPECIFIED JOINT: ICD-10-CM

## 2024-08-30 DIAGNOSIS — G47.00 INSOMNIA, UNSPECIFIED TYPE: ICD-10-CM

## 2024-08-30 DIAGNOSIS — G47.30 SLEEP APNEA, UNSPECIFIED TYPE: ICD-10-CM

## 2024-08-30 DIAGNOSIS — R73.03 PRE-DIABETES: ICD-10-CM

## 2024-08-30 DIAGNOSIS — F33.9 MAJOR DEPRESSION, RECURRENT, CHRONIC: ICD-10-CM

## 2024-08-30 DIAGNOSIS — Z78.0 MENOPAUSE: Primary | ICD-10-CM

## 2024-08-30 DIAGNOSIS — Z78.0 MENOPAUSE: ICD-10-CM

## 2024-08-30 DIAGNOSIS — R53.83 FATIGUE, UNSPECIFIED TYPE: ICD-10-CM

## 2024-08-30 DIAGNOSIS — E66.01 CLASS 3 SEVERE OBESITY DUE TO EXCESS CALORIES WITHOUT SERIOUS COMORBIDITY WITH BODY MASS INDEX (BMI) OF 40.0 TO 44.9 IN ADULT: ICD-10-CM

## 2024-08-30 LAB
ALBUMIN SERPL BCP-MCNC: 3.7 G/DL (ref 3.5–5.2)
ALP SERPL-CCNC: 64 U/L (ref 55–135)
ALT SERPL W/O P-5'-P-CCNC: 12 U/L (ref 10–44)
ANION GAP SERPL CALC-SCNC: 9 MMOL/L (ref 8–16)
AST SERPL-CCNC: 15 U/L (ref 10–40)
BASOPHILS # BLD AUTO: 0.06 K/UL (ref 0–0.2)
BASOPHILS NFR BLD: 0.7 % (ref 0–1.9)
BILIRUB SERPL-MCNC: 0.8 MG/DL (ref 0.1–1)
BUN SERPL-MCNC: 9 MG/DL (ref 8–23)
CALCIUM SERPL-MCNC: 9.8 MG/DL (ref 8.7–10.5)
CHLORIDE SERPL-SCNC: 105 MMOL/L (ref 95–110)
CHOLEST SERPL-MCNC: 239 MG/DL (ref 120–199)
CHOLEST/HDLC SERPL: 4.7 {RATIO} (ref 2–5)
CO2 SERPL-SCNC: 25 MMOL/L (ref 23–29)
CREAT SERPL-MCNC: 0.9 MG/DL (ref 0.5–1.4)
DIFFERENTIAL METHOD BLD: ABNORMAL
EOSINOPHIL # BLD AUTO: 0.1 K/UL (ref 0–0.5)
EOSINOPHIL NFR BLD: 1.1 % (ref 0–8)
ERYTHROCYTE [DISTWIDTH] IN BLOOD BY AUTOMATED COUNT: 13.5 % (ref 11.5–14.5)
EST. GFR  (NO RACE VARIABLE): >60 ML/MIN/1.73 M^2
ESTIMATED AVG GLUCOSE: 117 MG/DL (ref 68–131)
FSH SERPL-ACNC: 24.73 MIU/ML
GLUCOSE SERPL-MCNC: 82 MG/DL (ref 70–110)
HBA1C MFR BLD: 5.7 % (ref 4–5.6)
HCT VFR BLD AUTO: 39.9 % (ref 37–48.5)
HDLC SERPL-MCNC: 51 MG/DL (ref 40–75)
HDLC SERPL: 21.3 % (ref 20–50)
HGB BLD-MCNC: 12.5 G/DL (ref 12–16)
IMM GRANULOCYTES # BLD AUTO: 0.03 K/UL (ref 0–0.04)
IMM GRANULOCYTES NFR BLD AUTO: 0.3 % (ref 0–0.5)
LDLC SERPL CALC-MCNC: 162.4 MG/DL (ref 63–159)
LH SERPL-ACNC: 12 MIU/ML
LYMPHOCYTES # BLD AUTO: 3.3 K/UL (ref 1–4.8)
LYMPHOCYTES NFR BLD: 37.1 % (ref 18–48)
MCH RBC QN AUTO: 28.1 PG (ref 27–31)
MCHC RBC AUTO-ENTMCNC: 31.3 G/DL (ref 32–36)
MCV RBC AUTO: 90 FL (ref 82–98)
MONOCYTES # BLD AUTO: 0.6 K/UL (ref 0.3–1)
MONOCYTES NFR BLD: 6.3 % (ref 4–15)
NEUTROPHILS # BLD AUTO: 4.8 K/UL (ref 1.8–7.7)
NEUTROPHILS NFR BLD: 54.5 % (ref 38–73)
NONHDLC SERPL-MCNC: 188 MG/DL
NRBC BLD-RTO: 0 /100 WBC
PLATELET # BLD AUTO: 308 K/UL (ref 150–450)
PMV BLD AUTO: 9.9 FL (ref 9.2–12.9)
POTASSIUM SERPL-SCNC: 4.3 MMOL/L (ref 3.5–5.1)
PROT SERPL-MCNC: 7.4 G/DL (ref 6–8.4)
RBC # BLD AUTO: 4.45 M/UL (ref 4–5.4)
SODIUM SERPL-SCNC: 139 MMOL/L (ref 136–145)
T4 FREE SERPL-MCNC: 0.94 NG/DL (ref 0.71–1.51)
TRIGL SERPL-MCNC: 128 MG/DL (ref 30–150)
TSH SERPL DL<=0.005 MIU/L-ACNC: 0.56 UIU/ML (ref 0.4–4)
WBC # BLD AUTO: 8.78 K/UL (ref 3.9–12.7)

## 2024-08-30 PROCEDURE — 84443 ASSAY THYROID STIM HORMONE: CPT | Performed by: NURSE PRACTITIONER

## 2024-08-30 PROCEDURE — 83036 HEMOGLOBIN GLYCOSYLATED A1C: CPT | Performed by: NURSE PRACTITIONER

## 2024-08-30 PROCEDURE — 84439 ASSAY OF FREE THYROXINE: CPT | Performed by: NURSE PRACTITIONER

## 2024-08-30 PROCEDURE — 85025 COMPLETE CBC W/AUTO DIFF WBC: CPT | Performed by: NURSE PRACTITIONER

## 2024-08-30 PROCEDURE — 83001 ASSAY OF GONADOTROPIN (FSH): CPT | Performed by: NURSE PRACTITIONER

## 2024-08-30 PROCEDURE — 36415 COLL VENOUS BLD VENIPUNCTURE: CPT | Mod: PN | Performed by: NURSE PRACTITIONER

## 2024-08-30 PROCEDURE — 80053 COMPREHEN METABOLIC PANEL: CPT | Performed by: NURSE PRACTITIONER

## 2024-08-30 PROCEDURE — 83002 ASSAY OF GONADOTROPIN (LH): CPT | Performed by: NURSE PRACTITIONER

## 2024-08-30 PROCEDURE — 82671 ASSAY OF ESTROGENS: CPT | Performed by: NURSE PRACTITIONER

## 2024-08-30 PROCEDURE — 99999 PR PBB SHADOW E&M-EST. PATIENT-LVL V: CPT | Mod: PBBFAC,,, | Performed by: NURSE PRACTITIONER

## 2024-08-30 PROCEDURE — 80061 LIPID PANEL: CPT | Performed by: NURSE PRACTITIONER

## 2024-08-30 RX ORDER — MELOXICAM 15 MG/1
15 TABLET ORAL DAILY
Qty: 30 TABLET | Refills: 1 | Status: SHIPPED | OUTPATIENT
Start: 2024-08-30

## 2024-08-30 RX ORDER — TRAZODONE HYDROCHLORIDE 50 MG/1
50 TABLET ORAL NIGHTLY
Qty: 30 TABLET | Refills: 3 | Status: SHIPPED | OUTPATIENT
Start: 2024-08-30 | End: 2025-08-30

## 2024-09-03 ENCOUNTER — PATIENT MESSAGE (OUTPATIENT)
Dept: PRIMARY CARE CLINIC | Facility: CLINIC | Age: 64
End: 2024-09-03
Payer: COMMERCIAL

## 2024-09-03 DIAGNOSIS — E78.5 HYPERLIPIDEMIA, UNSPECIFIED HYPERLIPIDEMIA TYPE: Primary | ICD-10-CM

## 2024-09-03 RX ORDER — ATORVASTATIN CALCIUM 20 MG/1
20 TABLET, FILM COATED ORAL DAILY
Qty: 90 TABLET | Refills: 3 | Status: SHIPPED | OUTPATIENT
Start: 2024-09-03 | End: 2025-09-03

## 2024-09-05 ENCOUNTER — TELEPHONE (OUTPATIENT)
Dept: OBSTETRICS AND GYNECOLOGY | Facility: CLINIC | Age: 64
End: 2024-09-05
Payer: COMMERCIAL

## 2024-09-05 NOTE — TELEPHONE ENCOUNTER
----- Message from Tigist Feliz sent at 8/30/2024  9:18 AM CDT -----  NP Reny Aguilar has put in a referral for Consult to Gynecology. Please assist in scheduling.      Menopause [Z78.0]  Fatigue, unspecified type [R53.83]    Thanks

## 2024-09-06 LAB
ESTRADIOL SERPL HS-MCNC: 51 PG/ML
ESTROGEN SERPL CALC-MCNC: 112 PG/ML
ESTRONE SERPL-MCNC: 61 PG/ML

## 2024-10-22 ENCOUNTER — PATIENT MESSAGE (OUTPATIENT)
Dept: OBSTETRICS AND GYNECOLOGY | Facility: CLINIC | Age: 64
End: 2024-10-22
Payer: COMMERCIAL

## 2024-11-07 ENCOUNTER — OFFICE VISIT (OUTPATIENT)
Dept: SLEEP MEDICINE | Facility: CLINIC | Age: 64
End: 2024-11-07
Payer: COMMERCIAL

## 2024-11-07 DIAGNOSIS — G47.00 INSOMNIA, UNSPECIFIED TYPE: ICD-10-CM

## 2024-11-07 DIAGNOSIS — G47.33 OBSTRUCTIVE SLEEP APNEA: Primary | ICD-10-CM

## 2024-11-07 DIAGNOSIS — G47.30 SLEEP APNEA, UNSPECIFIED TYPE: ICD-10-CM

## 2024-11-07 DIAGNOSIS — G47.19 OTHER HYPERSOMNIA: ICD-10-CM

## 2024-11-07 PROCEDURE — 99204 OFFICE O/P NEW MOD 45 MIN: CPT | Mod: 95,,, | Performed by: NURSE PRACTITIONER

## 2024-11-07 NOTE — PROGRESS NOTES
The patient location is: [place]  The chief complaint leading to consultation is: trouble with sleep    Visit type: audiovisual    60 minutes of total time spent on the encounter, which includes face to face time and non-face to face time preparing to see the patient (eg, review of tests), Obtaining and/or reviewing separately obtained history, Documenting clinical information in the electronic or other health record, Independently interpreting results (not separately reported) and communicating results to the patient/family/caregiver, or Care coordination (not separately reported).     Each patient to whom he or she provides medical services by telemedicine is:  (1) informed of the relationship between the physician and patient and the respective role of any other health care provider with respect to management of the patient; and (2) notified that he or she may decline to receive medical services by telemedicine and may withdraw from such care at any time.    Referred by Reny Aguilar NP         NEW PATIENT VISIT    Sandra Velazquez  is a pleasant 64 y.o. female established with the [facility]    Here today for:  follow-up     Since last visit:   See assessment below      Past Medical History:   Diagnosis Date    Anxiety     Arthritis     Constipation     Deafness in right ear     Depression     Genital HSV     GERD (gastroesophageal reflux disease)     Hemorrhoids     Hyperlipidemia     BAR on CPAP     Pre-diabetes 02/02/2024     Patient Active Problem List   Diagnosis    Herpes    Gastroesophageal reflux disease without esophagitis    Hot flashes    Irritable bowel syndrome with constipation    Hiatal hernia    BAR on CPAP    Anxiety associated with depression    Morbid obesity with BMI of 40.0-44.9, adult    Hyperlipidemia    Pre-diabetes       Current Outpatient Medications:     acyclovir (ZOVIRAX) 400 MG tablet, , Disp: , Rfl:     atorvastatin (LIPITOR) 20 MG tablet, Take 1 tablet (20 mg total)  by mouth once daily., Disp: 90 tablet, Rfl: 3    cholecalciferol, vitamin D3, 10 mcg (400 unit) Cap, Take 1 capsule (400 Units total) by mouth once daily., Disp: , Rfl: 0    estradioL (ESTRACE) 0.01 % (0.1 mg/gram) vaginal cream, APPLY 1 APPLICATORFUL VAGINALLY AT BEDTIME EVERY SUNDAY, Disp: , Rfl:     estradiol 0.1 mg/24 hr td ptwk 0.1 mg/24 hr PTWK, Place 1 patch onto the skin every 7 days., Disp: , Rfl:     fluticasone propionate (FLONASE) 50 mcg/actuation nasal spray, 2 sprays (100 mcg total) by Each Nostril route once daily., Disp: 16 g, Rfl: 5    hydrocortisone 2.5 % cream, Apply topically 2 (two) times daily. for 7 days, Disp: 20 g, Rfl: 0    levocetirizine (XYZAL) 5 MG tablet, Take 1 tablet (5 mg total) by mouth every evening., Disp: 90 tablet, Rfl: 1    linaCLOtide (LINZESS) 145 mcg Cap capsule, Take 1 capsule (145 mcg total) by mouth before breakfast., Disp: 30 capsule, Rfl: 11    meloxicam (MOBIC) 15 MG tablet, Take 1 tablet (15 mg total) by mouth once daily., Disp: 30 tablet, Rfl: 1    meloxicam 15 mg TbDL, Take 15 mg by mouth once daily., Disp: , Rfl:     traZODone (DESYREL) 50 MG tablet, Take 1 tablet (50 mg total) by mouth every evening., Disp: 30 tablet, Rfl: 3    vitamin B complex-folic acid (B COMPLEX 1, WITH FOLIC ACID,) 0.4 mg Tab, Take 1 tablet by mouth once daily., Disp: , Rfl:      There were no vitals filed for this visit.  Physical Exam:    GEN:   Well-appearing  Psych:  Appropriate affect, demonstrates insight  SKIN:  No rash on the face or bridge of the nose      LABS:   Lab Results   Component Value Date    HGB 12.5 08/30/2024    CO2 25 08/30/2024         RECORDS REVIEWED:        ASSESSMENT    ASSESSMENT    Sig PMH:  PROBLEM DESCRIPTION/ Sx on Presentation Interval Hx STATUS PLAN     BAR   Presentation:   Diagnosed with BAR after sleep study in 2018 after visiting ENT for mouth dryness and sore throat.    Started on CPAP in 2018.    PAP history   Dx Study    Machine age Resmed airsense 10  2018   Mask FFM   DME [facility] sleep supplier   [employer]    PAP altn    Benefits    PROBS           Since last visit:     Not sure if the CPAP is helping with quality of sleep.  She has been having very disrupted sleep recently, but admits that there are many possible contributors to the problem including hormonal issues, knee pain.  Reports increased somnolence.    Denies air hunger, aerophagia, mask problems.    Cannot access CPAP machine wirelessly for settings and therapy data, was setup at outside E.  Will attempt to add her to [facility].    Reports a pressure setting of 12.         new to me    Cannot find sleep study results in EMR. Will track down study results for insurance purposes.    The patient is using and benefitting from PAP therapy    Will refer for titration study in light of increased somnolence despite cpap usage.         Daytime Sx     ESS 10/24 on intake        new to me   -will reassess sleepiness after BAR is adequately controlled     Insomnia       Difficulty with sleep onset and sleep maintenance.    SLEEP SCHEDULE   Duration    Wind- down    Envmnt    CBTi    Meds prior Melatonin (gives her nightmares, mind races)   Meds now Ambien PRN   Bed Time 830-930PM   Lights out    Latency 30-60min   Arousals 2   Back to sleep varies   Stim. ctrl    Wake time 3-330AM   Caffeine    Naps never   Nocturia 1-2   Work LSBME                 new to me   -will reassess after optimizing treatment of BAR       Nocturia     x 1-2 per sleep period    new to me      Other issues:     RTC:  will arrange RTC depending on results of sleep testing

## 2024-11-11 ENCOUNTER — CLINICAL SUPPORT (OUTPATIENT)
Dept: OBSTETRICS AND GYNECOLOGY | Facility: CLINIC | Age: 64
End: 2024-11-11
Payer: COMMERCIAL

## 2024-11-11 DIAGNOSIS — N95.1 MENOPAUSAL SYMPTOMS: Primary | ICD-10-CM

## 2024-11-12 NOTE — PROGRESS NOTES
[facility] Menopause Consultation Questionnaire  Personal Information  Full Name: Sandra Velazquez 1960    Medical History  Do you have any chronic medical conditions? (e.g., diabetes, hypertension, thyroid issues)  If yes, please specify: No- prediabetic   Do you have a history of hormone-related conditions? (e.g., endometriosis, breast cancer)  If yes, please explain: No   Have you previously or are currently taking hormone replacement therapy?  If yes, please list: Estradiol 0.1 mg twice daily and estradiol cream -weekly   Menstrual History  At what age did you begin menstruating?  Have you experienced any changes in your menstrual cycle in the last year?  Yes / No  If yes, please describe: Hysterectomy   When was your last menstrual period (or age of last period)?  Have you had a hysterectomy?  Yes / No  If yes, at what age? Ovaries removed 2004     Symptoms Assessment  Are you experiencing any of the following symptoms?  Hot flashes: Yes / No  Night sweats: Yes / No  Vaginal dryness or pain with intercourse: Yes / No  Mood swings: Yes / No  Anxiety or depression: Yes / No  Sleep disturbances: Yes / No  Fatigue:  Yes / No  Weight gain: Yes / No  Joint or muscle pain: Yes / No  Memory issues or brain fog: Yes / No  Decreased interest in sext (low libido): Yes / No    Lifestyle Factors  How would you describe your diet?  Balanced / High in processed foods / Vegetarian / Other: Needs improvement. Craves sugar   How often do you exercise?  Rarely / Occasionally / Regularly  Do you smoke or consume alcohol?  Yes / No  If yes, please specify frequency: Social Drinker and nicotine vaping   How would you rate your stress levels?  Low / Moderate / High    Family History  Is there a family history of menopause-related conditions? (e.g., osteoporosis, breast cancer)  Yes / No  If yes, please specify:  Is there a family history of heart disease?  Yes / No  If yes, please specify:

## 2024-11-18 ENCOUNTER — OFFICE VISIT (OUTPATIENT)
Dept: OBSTETRICS AND GYNECOLOGY | Facility: CLINIC | Age: 64
End: 2024-11-18
Payer: COMMERCIAL

## 2024-11-18 VITALS
SYSTOLIC BLOOD PRESSURE: 98 MMHG | WEIGHT: 225.38 LBS | BODY MASS INDEX: 42.55 KG/M2 | DIASTOLIC BLOOD PRESSURE: 57 MMHG | HEIGHT: 61 IN

## 2024-11-18 DIAGNOSIS — Z13.820 SCREENING FOR OSTEOPOROSIS: Primary | ICD-10-CM

## 2024-11-18 DIAGNOSIS — R53.83 FATIGUE, UNSPECIFIED TYPE: ICD-10-CM

## 2024-11-18 DIAGNOSIS — Z78.0 MENOPAUSE: ICD-10-CM

## 2024-11-18 PROCEDURE — 99204 OFFICE O/P NEW MOD 45 MIN: CPT | Mod: S$GLB,,, | Performed by: NURSE PRACTITIONER

## 2024-11-18 PROCEDURE — 99999 PR PBB SHADOW E&M-EST. PATIENT-LVL IV: CPT | Mod: PBBFAC,,, | Performed by: NURSE PRACTITIONER

## 2024-11-18 RX ORDER — PROGESTERONE 100 MG/1
100 CAPSULE ORAL NIGHTLY
Qty: 30 CAPSULE | Refills: 11 | Status: SHIPPED | OUTPATIENT
Start: 2024-11-18 | End: 2025-11-18

## 2024-11-18 NOTE — PROGRESS NOTES
Subjective:      Sandra Velazquez is a 64 y.o. female who presents to discuss hormone replacement therapy. She is SP hysterectomy with BSO, performed in 2004. Current use of Vivelle Dot 0.1 mg/24 hr PTSW twice weekly, Estrace vaginal cream twice weekly. She presents today due to persistent insomnia, fatigue, mental fogginess, and anxiety/irritability. Additionally, central weight gain, has carb cravings.     She patient is taking hormone replacement therapy. Patient denies post-menopausal vaginal bleeding. The patient is sexually active.  She denies the following contraindications to HRT:  Vaginal bleeding, history of VTE/PE, thrombophilia,  breast cancer, or active liver disease.       Hemoglobin A1C   Date Value Ref Range Status   08/30/2024 5.7 (H) 4.0 - 5.6 % Final     Comment:     ADA Screening Guidelines:  5.7-6.4%  Consistent with prediabetes  >or=6.5%  Consistent with diabetes    High levels of fetal hemoglobin interfere with the HbA1C  assay. Heterozygous hemoglobin variants (HbS, HgC, etc)do  not significantly interfere with this assay.   However, presence of multiple variants may affect accuracy.     02/09/2024 5.6 4.0 - 5.6 % Final     Comment:     ADA Screening Guidelines:  5.7-6.4%  Consistent with prediabetes  >or=6.5%  Consistent with diabetes    High levels of fetal hemoglobin interfere with the HbA1C  assay. Heterozygous hemoglobin variants (HbS, HgC, etc)do  not significantly interfere with this assay.   However, presence of multiple variants may affect accuracy.     03/17/2023 5.7 (H) 4.0 - 5.6 % Final     Comment:     ADA Screening Guidelines:  5.7-6.4%  Consistent with prediabetes  >or=6.5%  Consistent with diabetes    High levels of fetal hemoglobin interfere with the HbA1C  assay. Heterozygous hemoglobin variants (HbS, HgC, etc)do  not significantly interfere with this assay.   However, presence of multiple variants may affect accuracy.             Pap smear: No result found  Mammogram:  2024  DEXA: none      Lab Visit on 08/30/2024   Component Date Value Ref Range Status    Follicle Stimulating Hormone 08/30/2024 24.73  See Text mIU/mL Final    Estrone (Esoterix) 08/30/2024 61  pg/mL Final    Estradiol 08/30/2024 51  pg/mL Final    Estrogen 08/30/2024 112  pg/mL Final    LH 08/30/2024 12.0  See Text mIU/mL Final    WBC 08/30/2024 8.78  3.90 - 12.70 K/uL Final    RBC 08/30/2024 4.45  4.00 - 5.40 M/uL Final    Hemoglobin 08/30/2024 12.5  12.0 - 16.0 g/dL Final    Hematocrit 08/30/2024 39.9  37.0 - 48.5 % Final    MCV 08/30/2024 90  82 - 98 fL Final    MCH 08/30/2024 28.1  27.0 - 31.0 pg Final    MCHC 08/30/2024 31.3 (L)  32.0 - 36.0 g/dL Final    RDW 08/30/2024 13.5  11.5 - 14.5 % Final    Platelets 08/30/2024 308  150 - 450 K/uL Final    MPV 08/30/2024 9.9  9.2 - 12.9 fL Final    Immature Granulocytes 08/30/2024 0.3  0.0 - 0.5 % Final    Gran # (ANC) 08/30/2024 4.8  1.8 - 7.7 K/uL Final    Immature Grans (Abs) 08/30/2024 0.03  0.00 - 0.04 K/uL Final    Lymph # 08/30/2024 3.3  1.0 - 4.8 K/uL Final    Mono # 08/30/2024 0.6  0.3 - 1.0 K/uL Final    Eos # 08/30/2024 0.1  0.0 - 0.5 K/uL Final    Baso # 08/30/2024 0.06  0.00 - 0.20 K/uL Final    nRBC 08/30/2024 0  0 /100 WBC Final    Gran % 08/30/2024 54.5  38.0 - 73.0 % Final    Lymph % 08/30/2024 37.1  18.0 - 48.0 % Final    Mono % 08/30/2024 6.3  4.0 - 15.0 % Final    Eosinophil % 08/30/2024 1.1  0.0 - 8.0 % Final    Basophil % 08/30/2024 0.7  0.0 - 1.9 % Final    Differential Method 08/30/2024 Automated   Final    Sodium 08/30/2024 139  136 - 145 mmol/L Final    Potassium 08/30/2024 4.3  3.5 - 5.1 mmol/L Final    Chloride 08/30/2024 105  95 - 110 mmol/L Final    CO2 08/30/2024 25  23 - 29 mmol/L Final    Glucose 08/30/2024 82  70 - 110 mg/dL Final    BUN 08/30/2024 9  8 - 23 mg/dL Final    Creatinine 08/30/2024 0.9  0.5 - 1.4 mg/dL Final    Calcium 08/30/2024 9.8  8.7 - 10.5 mg/dL Final    Total Protein 08/30/2024 7.4  6.0 - 8.4 g/dL Final    Albumin  08/30/2024 3.7  3.5 - 5.2 g/dL Final    Total Bilirubin 08/30/2024 0.8  0.1 - 1.0 mg/dL Final    Alkaline Phosphatase 08/30/2024 64  55 - 135 U/L Final    AST 08/30/2024 15  10 - 40 U/L Final    ALT 08/30/2024 12  10 - 44 U/L Final    eGFR 08/30/2024 >60.0  >60 mL/min/1.73 m^2 Final    Anion Gap 08/30/2024 9  8 - 16 mmol/L Final    Cholesterol 08/30/2024 239 (H)  120 - 199 mg/dL Final    Triglycerides 08/30/2024 128  30 - 150 mg/dL Final    HDL 08/30/2024 51  40 - 75 mg/dL Final    LDL Cholesterol 08/30/2024 162.4 (H)  63.0 - 159.0 mg/dL Final    HDL/Cholesterol Ratio 08/30/2024 21.3  20.0 - 50.0 % Final    Total Cholesterol/HDL Ratio 08/30/2024 4.7  2.0 - 5.0 Final    Non-HDL Cholesterol 08/30/2024 188  mg/dL Final    Free T4 08/30/2024 0.94  0.71 - 1.51 ng/dL Final    TSH 08/30/2024 0.562  0.400 - 4.000 uIU/mL Final    Hemoglobin A1C 08/30/2024 5.7 (H)  4.0 - 5.6 % Final    Estimated Avg Glucose 08/30/2024 117  68 - 131 mg/dL Final       Past Medical History:   Diagnosis Date    Anxiety     Arthritis     Constipation     Deafness in right ear     Depression     Genital HSV     GERD (gastroesophageal reflux disease)     Hemorrhoids     Hyperlipidemia     BAR on CPAP     Pre-diabetes 02/02/2024     Past Surgical History:   Procedure Laterality Date    BILATERAL SALPINGO-OOPHORECTOMY (BSO)      CARPAL TUNNEL RELEASE Bilateral     COLONOSCOPY N/A 3/20/2024    Procedure: COLONOSCOPY;  Surgeon: Nguyen Victor MD;  Location: [facility] ENDOSCOPY;  Service: Endoscopy;  Laterality: N/A;  Referred by: Dr. FREYA Harrell  Prep: PEG  Route instructions sent: portal  Other concerns: + polyps per pt, also some mild rectal bleeding per pt  SW    TOTAL ABDOMINAL HYSTERECTOMY  2004     Social History     Tobacco Use    Smoking status: Every Day     Types: Vaping with nicotine     Passive exposure: Never    Smokeless tobacco: Never    Tobacco comments:     Former cigarette smoking age 16 to 56, 1/3 PPD.   Substance Use Topics    Alcohol  use: Yes     Comment: Occasional.    Drug use: Yes     Comment: CBD oil - joints anxiety     Family History   Problem Relation Name Age of Onset    Asthma Mother          Digestive Issue / Maternal Cousins with Lupus    GI problems Mother      Stroke Father  65    Hypertension Father      Bell's palsy Father      Crohn's disease Father      Scoliosis Sister Amber     Hyperlipidemia Sister Claudette     Crohn's disease Daughter      Colon cancer Maternal Uncle      Breast cancer Neg Hx      Ovarian cancer Neg Hx      Heart attacks under age 50 Neg Hx       OB History    Para Term  AB Living   2 2 2     2   SAB IAB Ectopic Multiple Live Births           2      # Outcome Date GA Lbr Tha/2nd Weight Sex Type Anes PTL Lv   2 Term  40w0d  0.224 kg (7.9 oz) M Vag-Spont   DERIK   1 Term  40w0d  0.221 kg (7.8 oz) F Vag-Spont   DERIK       Current Outpatient Medications:     acyclovir (ZOVIRAX) 400 MG tablet, , Disp: , Rfl:     atorvastatin (LIPITOR) 20 MG tablet, Take 1 tablet (20 mg total) by mouth once daily., Disp: 90 tablet, Rfl: 3    cholecalciferol, vitamin D3, 10 mcg (400 unit) Cap, Take 1 capsule (400 Units total) by mouth once daily., Disp: , Rfl: 0    estradioL (ESTRACE) 0.01 % (0.1 mg/gram) vaginal cream, APPLY 1 APPLICATORFUL VAGINALLY AT BEDTIME EVERY , Disp: , Rfl:     estradiol 0.1 mg/24 hr td ptwk 0.1 mg/24 hr PTWK, Place 1 patch onto the skin every 7 days., Disp: , Rfl:     fluticasone propionate (FLONASE) 50 mcg/actuation nasal spray, 2 sprays (100 mcg total) by Each Nostril route once daily., Disp: 16 g, Rfl: 5    levocetirizine (XYZAL) 5 MG tablet, Take 1 tablet (5 mg total) by mouth every evening., Disp: 90 tablet, Rfl: 1    linaCLOtide (LINZESS) 145 mcg Cap capsule, Take 1 capsule (145 mcg total) by mouth before breakfast., Disp: 30 capsule, Rfl: 11    meloxicam (MOBIC) 15 MG tablet, Take 1 tablet (15 mg total) by mouth once daily., Disp: 30 tablet, Rfl: 1    vitamin B  "complex-folic acid (B COMPLEX 1, WITH FOLIC ACID,) 0.4 mg Tab, Take 1 tablet by mouth once daily., Disp: , Rfl:     hydrocortisone 2.5 % cream, Apply topically 2 (two) times daily. for 7 days, Disp: 20 g, Rfl: 0    meloxicam 15 mg TbDL, Take 15 mg by mouth once daily., Disp: , Rfl:     progesterone (PROMETRIUM) 100 MG capsule, Take 1 capsule (100 mg total) by mouth nightly., Disp: 30 capsule, Rfl: 11    traZODone (DESYREL) 50 MG tablet, Take 1 tablet (50 mg total) by mouth every evening. (Patient not taking: Reported on 11/18/2024), Disp: 30 tablet, Rfl: 3    Vitals:    11/18/24 0830   BP: (!) 98/57   Weight: 102.2 kg (225 lb 6.4 oz)   Height: 5' 1" (1.549 m)   PainSc: 0-No pain     Body mass index is 42.59 kg/m².    Assessment:    Menopause  -     Ambulatory referral/consult to Gynecology  -     progesterone (PROMETRIUM) 100 MG capsule; Take 1 capsule (100 mg total) by mouth nightly.  Dispense: 30 capsule; Refill: 11    Fatigue, unspecified type  -     Ambulatory referral/consult to Gynecology  -     progesterone (PROMETRIUM) 100 MG capsule; Take 1 capsule (100 mg total) by mouth nightly.  Dispense: 30 capsule; Refill: 11        Plan:   Risks and benefits of hormone replacement therapy were discussed.  Hormone replacement therapy options, including bioidentical versus non-bioidentical hormones, as well as alternatives discussed.    HRT lab work today. Referral to [facility], for weight loss consultation.    DEXA scan scheduled    Start:  Prometrium 100 mg PO QHS.    Follow up in 6 weeks  Will recheck labs once on typical optimal dose or if having side effects.  Instructed patient to call if she experiences any side effects or has any questions.       JUSTICE Mcneil        "

## 2024-11-28 DIAGNOSIS — G47.00 INSOMNIA, UNSPECIFIED TYPE: ICD-10-CM

## 2024-11-29 RX ORDER — TRAZODONE HYDROCHLORIDE 50 MG/1
50 TABLET ORAL NIGHTLY
Qty: 90 TABLET | Refills: 1 | OUTPATIENT
Start: 2024-11-29

## 2024-12-02 ENCOUNTER — PATIENT MESSAGE (OUTPATIENT)
Dept: SLEEP MEDICINE | Facility: CLINIC | Age: 64
End: 2024-12-02
Payer: COMMERCIAL

## 2025-02-17 ENCOUNTER — HOSPITAL ENCOUNTER (OUTPATIENT)
Dept: RADIOLOGY | Facility: HOSPITAL | Age: 65
Discharge: HOME | End: 2025-02-17
Attending: INTERNAL MEDICINE
Payer: COMMERCIAL

## 2025-02-17 DIAGNOSIS — Z12.31 ENCOUNTER FOR SCREENING MAMMOGRAM FOR BREAST CANCER: ICD-10-CM

## 2025-02-17 PROCEDURE — 77063 BREAST TOMOSYNTHESIS BI: CPT | Mod: TC

## 2025-03-27 ENCOUNTER — TELEPHONE (OUTPATIENT)
Dept: ENDOSCOPY | Facility: HOSPITAL | Age: 65
End: 2025-03-27
Payer: COMMERCIAL

## 2025-03-27 ENCOUNTER — RESULTS FOLLOW-UP (OUTPATIENT)
Dept: PRIMARY CARE CLINIC | Facility: CLINIC | Age: 65
End: 2025-03-27

## 2025-03-27 ENCOUNTER — TELEPHONE (OUTPATIENT)
Dept: PRIMARY CARE CLINIC | Facility: CLINIC | Age: 65
End: 2025-03-27

## 2025-03-27 ENCOUNTER — HOSPITAL ENCOUNTER (OUTPATIENT)
Dept: RADIOLOGY | Facility: HOSPITAL | Age: 65
Discharge: HOME | End: 2025-03-27
Attending: NURSE PRACTITIONER
Payer: COMMERCIAL

## 2025-03-27 ENCOUNTER — OFFICE VISIT (OUTPATIENT)
Dept: PRIMARY CARE CLINIC | Facility: CLINIC | Age: 65
End: 2025-03-27
Payer: COMMERCIAL

## 2025-03-27 VITALS
SYSTOLIC BLOOD PRESSURE: 134 MMHG | OXYGEN SATURATION: 98 % | HEART RATE: 83 BPM | BODY MASS INDEX: 42.21 KG/M2 | WEIGHT: 223.56 LBS | DIASTOLIC BLOOD PRESSURE: 84 MMHG | HEIGHT: 61 IN

## 2025-03-27 DIAGNOSIS — R20.0 ANTERIOR THIGH NUMBNESS: ICD-10-CM

## 2025-03-27 DIAGNOSIS — J30.2 SEASONAL ALLERGIC RHINITIS, UNSPECIFIED TRIGGER: ICD-10-CM

## 2025-03-27 DIAGNOSIS — K59.00 CONSTIPATION, UNSPECIFIED CONSTIPATION TYPE: ICD-10-CM

## 2025-03-27 DIAGNOSIS — M79.644 BILATERAL THUMB PAIN: Primary | ICD-10-CM

## 2025-03-27 DIAGNOSIS — M54.2 NECK PAIN: ICD-10-CM

## 2025-03-27 DIAGNOSIS — M54.41 ACUTE BILATERAL LOW BACK PAIN WITH RIGHT-SIDED SCIATICA: ICD-10-CM

## 2025-03-27 DIAGNOSIS — K59.00 CONSTIPATION, UNSPECIFIED CONSTIPATION TYPE: Primary | ICD-10-CM

## 2025-03-27 DIAGNOSIS — M79.645 BILATERAL THUMB PAIN: Primary | ICD-10-CM

## 2025-03-27 PROCEDURE — 72100 X-RAY EXAM L-S SPINE 2/3 VWS: CPT | Mod: TC,PN

## 2025-03-27 PROCEDURE — 99214 OFFICE O/P EST MOD 30 MIN: CPT | Mod: S$GLB,,, | Performed by: NURSE PRACTITIONER

## 2025-03-27 PROCEDURE — 72040 X-RAY EXAM NECK SPINE 2-3 VW: CPT | Mod: TC,PN

## 2025-03-27 PROCEDURE — 99999 PR PBB SHADOW E&M-EST. PATIENT-LVL V: CPT | Mod: PBBFAC,,, | Performed by: NURSE PRACTITIONER

## 2025-03-27 PROCEDURE — 72100 X-RAY EXAM L-S SPINE 2/3 VWS: CPT | Mod: 26,,, | Performed by: RADIOLOGY

## 2025-03-27 PROCEDURE — 72040 X-RAY EXAM NECK SPINE 2-3 VW: CPT | Mod: 26,,, | Performed by: RADIOLOGY

## 2025-03-27 RX ORDER — ZOLPIDEM TARTRATE 6.25 MG/1
6.25 TABLET, FILM COATED, EXTENDED RELEASE ORAL NIGHTLY
COMMUNITY
Start: 2024-12-13

## 2025-03-27 RX ORDER — AZITHROMYCIN 250 MG/1
TABLET, FILM COATED ORAL
Qty: 6 TABLET | Refills: 0 | Status: SHIPPED | OUTPATIENT
Start: 2025-03-27 | End: 2025-04-01

## 2025-03-27 RX ORDER — LORAZEPAM 1 MG/1
1 TABLET ORAL
COMMUNITY

## 2025-03-27 RX ORDER — LEVOCETIRIZINE DIHYDROCHLORIDE 5 MG/1
5 TABLET, FILM COATED ORAL NIGHTLY
Qty: 90 TABLET | Refills: 1 | Status: SHIPPED | OUTPATIENT
Start: 2025-03-27

## 2025-03-27 RX ORDER — PREDNISONE 20 MG/1
20 TABLET ORAL DAILY
Qty: 5 TABLET | Refills: 0 | Status: SHIPPED | OUTPATIENT
Start: 2025-03-27

## 2025-03-27 RX ORDER — FLUTICASONE PROPIONATE 50 MCG
2 SPRAY, SUSPENSION (ML) NASAL DAILY
Qty: 16 G | Refills: 5 | Status: SHIPPED | OUTPATIENT
Start: 2025-03-27

## 2025-03-27 NOTE — PROGRESS NOTES
[facility] Note    Chief Complaint      Chief Complaint   Patient presents with    stifness     All over X 11/2 month    Rectal Bleeding    Constipation     X 4 months    Sinus Problem       History of Present Illness      Sandra Velazquez is a 65 y.o. female who presents today for   Chief Complaint   Patient presents with    stifness     All over X 11/2 month    Rectal Bleeding    Constipation     X 4 months    Sinus Problem         History of Present Illness    CHIEF COMPLAINT:  Patient presents with multiple concerns including joint pain, back pain, constipation, and allergy symptoms.    HPI:  Patient reports multiple symptoms. She has headaches and pressure attributed to seasonal allergies, with current pressure and headache onset.    She describes severe right hand pain radiating up her arm, with decreased  strength and difficulty opening jars. She had carpal tunnel surgery many years ago and is uncertain if the current symptoms are related or due to inflammation from frequent computer use.    She reports severe inflammation a few weeks ago resulting in extreme stiffness. She has sciatic nerve pain, though improved today. She notes numbness in a specific patch on her left thigh and reports her left foot is currently numb and tingling. These symptoms have been consistent for the past 3 weeks, often occurring when standing and gradually resolving.    She describes neck pain attributed to stress and back pain across her entire back, with sciatica on the right side.    She mentions severe constipation that has worsened over the past 6 months. Psyllium appeared to exacerbate the condition, and she now relies on stool softeners with laxative ingredients to have a bowel movement. She reports rectal bleeding, which she believes is due to straining and possibly internal hemorrhoids. She has been using Fleet suppositories or enemas for relief but believes this may be causing tears in her rectum, leading  to bleeding.    She had a colonoscopy last year, which was supposed to be valid for 7 years. She was having bleeding issues at that time as well.    She reports trying various remedies including collagen powder, which seemed to help when taken consistently. She has also been using SIDS pads, cell defense, and has started eating bran flakes to increase fiber intake.    Her hair is falling out and thinning.    She denies any recent car accidents or trauma that could explain her symptoms. She denies any hip pain or slipping sensations when walking.    MEDICAL HISTORY:  Patient has a history of carpal tunnel syndrome, osteoarthritis, internal hemorrhoids, and is menopausal/postmenopausal. Patient has received the shingles vaccine in the past. Patient is menopausal.      ROS:  General: -fever, -chills, -fatigue, -weight gain, -weight loss  Eyes: -vision changes, -redness, -discharge  ENT: -ear pain, +nasal congestion, -sore throat  Cardiovascular: -chest pain, -palpitations, -lower extremity edema  Respiratory: -cough, -shortness of breath  Gastrointestinal: -abdominal pain, -nausea, -vomiting, -diarrhea, +constipation, -blood in stool, +rectal bleeding  Genitourinary: -dysuria, -hematuria, -frequency  Musculoskeletal: -joint pain, -muscle pain, +weakness, +shooting pain sensation, +limb pain, +pain with movement, +joint stiffness, +muscle stiffness, +nerve pain, +neck pain, +back pain  Skin: -rash, -lesion, +abnormal hair loss  Neurological: +headache, -dizziness, +numbness, +tingling  Psychiatric: -anxiety, -depression, -sleep difficulty  Head: +head pain           Family History:  family history includes Asthma in her mother; Bell's palsy in her father; Colon cancer in her maternal uncle; Crohn's disease in her daughter and father; GI problems in her mother; Hyperlipidemia in her sister; Hypertension in her father; Scoliosis in her sister; Stroke (age of onset: 65) in her father.   Family history was reviewed with  "patient.     Medications:  Encounter Medications[1]    Allergies:  Review of patient's allergies indicates:   Allergen Reactions    Darvocet a500 [propoxyphene n-acetaminophen] Hallucinations    Oxycodone-acetaminophen Nausea And Vomiting    Oxycodone-aspirin Nausea And Vomiting    Tramadol Nausea And Vomiting       Health Maintenance:  Health Maintenance   Topic Date Due    RSV Vaccine (Age 60+ and Pregnant patients) (1 - Risk 60-74 years 1-dose series) 03/27/2025 (Originally 2/4/2021)    TETANUS VACCINE  03/27/2026 (Originally 2/4/1978)    Hemoglobin A1c (Prediabetes)  08/30/2025    Mammogram  02/17/2026    Lipid Panel  08/30/2029    Colorectal Cancer Screening  03/20/2031    Hepatitis C Screening  Completed    Shingles Vaccine  Completed    HIV Screening  Completed    Pneumococcal Vaccines (Age 50+)  Completed    Influenza Vaccine  Discontinued    COVID-19 Vaccine  Discontinued     Health Maintenance Topics with due status: Not Due       Topic Last Completion Date    Colorectal Cancer Screening 03/20/2024    Hemoglobin A1c (Prediabetes) 08/30/2024    Lipid Panel 08/30/2024    Mammogram 02/17/2025       Physical Exam      Vital Signs  Pulse: 83  SpO2: 98 %  BP: 134/84  BP Location: Right arm  Patient Position: Sitting  Pain Score:   6  Pain Loc: Hand  Height and Weight  Height: 5' 1" (154.9 cm)  Weight: 101.4 kg (223 lb 8.7 oz)  BSA (Calculated - sq m): 2.09 sq meters  BMI (Calculated): 42.3  Weight in (lb) to have BMI = 25: 132]    Physical Exam    General: No acute distress. Well-developed. Well-nourished.  Eyes: EOMI. Sclerae anicteric.  HENT: Normocephalic. Atraumatic. Nares patent. Moist oral mucosa.  Cardiovascular: Regular rate. Regular rhythm. No murmurs. No rubs. No gallops. Normal S1, S2.  Respiratory: Normal respiratory effort. Clear to auscultation bilaterally. No rales. No rhonchi. No wheezing. Clear lungs.  Musculoskeletal: No  obvious deformity.  Extremities: No lower extremity edema.  Neurological: " Alert & oriented x3. No slurred speech. Normal gait.  Psychiatric: Normal mood. Normal affect. Good insight. Good judgment.  Skin: Warm. Dry. No rash.            Assessment/Plan     Sandra Velazquez is a 65 y.o.female with:    Assessment & Plan    IMPRESSION:  - Considered internal hemorrhoids as cause of rectal bleeding.  - Removed incorrect drug use notation from record.    DEHYDRATION:  - Patient to increase water intake to at least 64 oz per day.    SKIN AND CONNECTIVE TISSUE DISORDERS:  - Patient to continue using collagen powder supplement.  - Colace daily for stool softener    RESPIRATORY TRACT INFECTION:  - Started prednisone 20 mg daily for 5 days, to be taken in the morning.  - Started azithromycin (Z-Choco): 2 tablets on day 1, then 1 tablet daily for 4 days for congestion and inflammation.    ALLERGIC RHINITIS:  - Renewed nasal spray (generic Flonase) for seasonal allergies.  - Started Xyzal (generic cetirizine) to be taken nightly for seasonal allergies.    CONSTIPATION:  - Patient to start taking Metamucil daily for constipation.    SPINAL STENOSIS AND OSTEOARTHRITIS:  - Ordered XR Neck and XR Lower Back to evaluate for potential stenosis and osteoarthritis.  - Referred to Dr. Garett Espinoza at [facility] for comprehensive evaluation of back and neck pain.  - Referred to physical therapy for back and neck pain.    HAND PAIN:  - Referred to hand clinic for reevaluation of hand pain.    GASTROINTESTINAL DISORDERS:  - Referred to Dr. Jhony Shen at [facility] for gastroenterology evaluation.    FOLLOW-UP:  - Follow up in August for routine medical exam.  - Contact office if symptoms worsen or additional concerns arise before next appointment.          As above, continue current medications and maintain follow up with specialists.  Return to clinic as needed.    Greater than 50% of visit was spent face to face with patient.  All questions were answered to patient's satisfaction.          Reny MILES  [facility]      This note was generated with the assistance of ambient listening technology. Verbal consent was obtained by the patient and accompanying visitor(s) for the recording of patient appointment to facilitate this note. I attest to having reviewed and edited the generated note for accuracy, though some syntax or spelling errors may persist. Please contact the author of this note for any clarification.              Answers submitted by the patient for this visit:  Review of Systems Questionnaire (Submitted on 3/24/2025)  activity change: Yes  unexpected weight change: No  neck pain: Yes  hearing loss: No  rhinorrhea: Yes  trouble swallowing: No  eye discharge: No  visual disturbance: No  chest tightness: No  wheezing: Yes  chest pain: No  palpitations: Yes  blood in stool: Yes  constipation: Yes  vomiting: No  diarrhea: No  polydipsia: No  polyuria: No  difficulty urinating: No  hematuria: No  menstrual problem: No  dysuria: No  joint swelling: Yes  arthralgias: Yes  headaches: Yes  weakness: No  confusion: No  dysphoric mood: Yes         [1]   Outpatient Encounter Medications as of 3/27/2025   Medication Sig Note Dispense Refill    acyclovir (ZOVIRAX) 400 MG tablet  3/17/2023: #90      atorvastatin (LIPITOR) 20 MG tablet Take 1 tablet (20 mg total) by mouth once daily.  90 tablet 3    cholecalciferol, vitamin D3, 10 mcg (400 unit) Cap Take 1 capsule (400 Units total) by mouth once daily.   0    estradioL (ESTRACE) 0.01 % (0.1 mg/gram) vaginal cream APPLY 1 APPLICATORFUL VAGINALLY AT BEDTIME EVERY SUNDAY       estradiol 0.1 mg/24 hr td ptwk 0.1 mg/24 hr PTWK Place 1 patch onto the skin every 7 days.       LORazepam (ATIVAN) 1 MG tablet Take 1 mg by mouth as needed for Anxiety.       meloxicam (MOBIC) 15 MG tablet Take 1 tablet (15 mg total) by mouth once daily.  30 tablet 1    progesterone (PROMETRIUM) 100 MG capsule Take 1 capsule (100 mg total) by mouth nightly.  30 capsule 11     vitamin B complex-folic acid (B COMPLEX 1, WITH FOLIC ACID,) 0.4 mg Tab Take 1 tablet by mouth once daily.       zolpidem (AMBIEN CR) 6.25 MG CR tablet Take 6.25 mg by mouth every evening.       [DISCONTINUED] fluticasone propionate (FLONASE) 50 mcg/actuation nasal spray 2 sprays (100 mcg total) by Each Nostril route once daily.  16 g 5    [DISCONTINUED] levocetirizine (XYZAL) 5 MG tablet Take 1 tablet (5 mg total) by mouth every evening.  90 tablet 1    azithromycin (Z-LORRAINE) 250 MG tablet Take 2 tablets (500 mg total) by mouth once daily for 1 day, THEN 1 tablet (250 mg total) once daily for 4 days.  6 tablet 0    fluticasone propionate (FLONASE) 50 mcg/actuation nasal spray 2 sprays (100 mcg total) by Each Nostril route once daily.  16 g 5    levocetirizine (XYZAL) 5 MG tablet Take 1 tablet (5 mg total) by mouth every evening.  90 tablet 1    [] linaCLOtide (LINZESS) 145 mcg Cap capsule Take 1 capsule (145 mcg total) by mouth before breakfast.  30 capsule 11    predniSONE (DELTASONE) 20 MG tablet Take 1 tablet (20 mg total) by mouth once daily.  5 tablet 0    [DISCONTINUED] hydrocortisone 2.5 % cream Apply topically 2 (two) times daily. for 7 days (Patient not taking: Reported on 3/27/2025)  20 g 0    [DISCONTINUED] meloxicam 15 mg TbDL Take 15 mg by mouth once daily. (Patient not taking: Reported on 3/27/2025)       [DISCONTINUED] traZODone (DESYREL) 50 MG tablet Take 1 tablet (50 mg total) by mouth every evening. (Patient not taking: Reported on 3/27/2025)  30 tablet 3     No facility-administered encounter medications on file as of 3/27/2025.

## 2025-03-27 NOTE — TELEPHONE ENCOUNTER
Contacted the patient to schedule an endoscopy procedure(s):  Colonoscopy. The patient did not answer the call. Voice message left requesting a call back.

## 2025-03-27 NOTE — TELEPHONE ENCOUNTER
Pt said she needs a referral to GI doctor. The referral for Endo will be canceled. See comment from [employer]...

## 2025-03-27 NOTE — TELEPHONE ENCOUNTER
----- Message from [employer] sent at 3/27/2025  1:04 PM CDT -----  Pt stated she just recently had a colonoscopy done she needs a referral put in to see the Gastro dept at [facility].  The referral for Consult to Endo Procedure  will get cancel.Thanks

## 2025-04-03 ENCOUNTER — OFFICE VISIT (OUTPATIENT)
Dept: GASTROENTEROLOGY | Facility: CLINIC | Age: 65
End: 2025-04-03
Payer: COMMERCIAL

## 2025-04-03 DIAGNOSIS — K59.04 CHRONIC IDIOPATHIC CONSTIPATION: Primary | ICD-10-CM

## 2025-04-03 DIAGNOSIS — K62.5 RECTAL BLEEDING: ICD-10-CM

## 2025-04-03 RX ORDER — LUBIPROSTONE 8 UG/1
8 CAPSULE ORAL 2 TIMES DAILY
Qty: 60 CAPSULE | Refills: 11 | Status: SHIPPED | OUTPATIENT
Start: 2025-04-03

## 2025-04-03 NOTE — PROGRESS NOTES
The patient location is: Home  The chief complaint leading to consultation is: rectal bleeding, constipation    Visit type: audiovisual    Face to Face time with patient: 12 mins  25 minutes of total time spent on the encounter, which includes face to face time and non-face to face time preparing to see the patient (eg, review of tests), Obtaining and/or reviewing separately obtained history, Documenting clinical information in the electronic or other health record, Independently interpreting results (not separately reported) and communicating results to the patient/family/caregiver, or Care coordination (not separately reported).     Each patient to whom he or she provides medical services by telemedicine is:  (1) informed of the relationship between the physician and patient and the respective role of any other health care provider with respect to management of the patient; and (2) notified that he or she may decline to receive medical services by telemedicine and may withdraw from such care at any time.                                                                                 Gastroenterology Progress Note    Reason for Visit:  The primary encounter diagnosis was Chronic idiopathic constipation. A diagnosis of Rectal bleeding was also pertinent to this visit.    PCP:   Reny Aguilar   [address] [facility] / [facility] [address]      Initial HPI   This is a 65 y.o. female presenting for constipation and rectal bleeding. Worsened about 4 months ago. Tried psyllium, but this worsened her constipation. Tried Miralax that did not help her. Linzess caused her abdominal cramping so she prefers to not take this. She uses fleets enemas and suppositories that could be causing bleeding as well.    She reports urge to defecate, but difficulty passing her stool which prompts her to strain.     ROS:  Review of Systems   Constitutional:  Negative for chills, fever, malaise/fatigue and weight loss.   HENT:   Negative for sore throat.    Eyes:  Negative for redness.   Gastrointestinal:  Positive for abdominal pain and constipation. Negative for blood in stool, diarrhea, heartburn, melena, nausea and vomiting.   Skin:  Negative for rash.   Neurological:  Negative for dizziness, loss of consciousness and weakness.        Medical History:  has a past medical history of Anxiety, Arthritis, Constipation, Deafness in right ear, Depression, Genital HSV, GERD (gastroesophageal reflux disease), Hemorrhoids, Hyperlipidemia, BAR on CPAP, and Pre-diabetes (02/02/2024).    Surgical History:  has a past surgical history that includes Total abdominal hysterectomy (2004); Carpal tunnel release (Bilateral); Bilateral salpingo-oophorectomy (BSO); and Colonoscopy (N/A, 3/20/2024).    Family History: family history includes Asthma in her mother; Bell's palsy in her father; Colon cancer in her maternal uncle; Crohn's disease in her daughter and father; GI problems in her mother; Hyperlipidemia in her sister; Hypertension in her father; Scoliosis in her sister; Stroke (age of onset: 65) in her father..       Review of patient's allergies indicates:   Allergen Reactions    Darvocet a500 [propoxyphene n-acetaminophen] Hallucinations    Oxycodone-acetaminophen Nausea And Vomiting    Oxycodone-aspirin Nausea And Vomiting    Tramadol Nausea And Vomiting       Medications Ordered Prior to Encounter[1]      Objective Findings:    Vital Signs:  There were no vitals taken for this visit.  There is no height or weight on file to calculate BMI.    Physical Exam:  Physical Exam  Vitals and nursing note reviewed.   Constitutional:       Appearance: She is normal weight. She is not ill-appearing.   HENT:      Mouth/Throat:      Mouth: Mucous membranes are moist.      Pharynx: Oropharynx is clear.   Eyes:      General: No scleral icterus.  Abdominal:      General: Abdomen is flat. Bowel sounds are normal. There is no distension.      Palpations: Abdomen is  soft. There is no mass.      Tenderness: There is no abdominal tenderness.      Hernia: No hernia is present.   Skin:     General: Skin is warm and dry.      Capillary Refill: Capillary refill takes less than 2 seconds.      Coloration: Skin is not jaundiced or pale.   Neurological:      Mental Status: She is alert and oriented to person, place, and time. Mental status is at baseline.             Labs:  Lab Results   Component Value Date    WBC 8.78 08/30/2024    HGB 12.5 08/30/2024    HCT 39.9 08/30/2024     08/30/2024    CHOL 239 (H) 08/30/2024    TRIG 128 08/30/2024    HDL 51 08/30/2024    ALKPHOS 64 08/30/2024    ALT 12 08/30/2024    AST 15 08/30/2024     08/30/2024    K 4.3 08/30/2024     08/30/2024    CREATININE 0.9 08/30/2024    BUN 9 08/30/2024    CO2 25 08/30/2024    TSH 0.562 08/30/2024    HGBA1C 5.7 (H) 08/30/2024         Imaging reviewed: No pertinent imaging reviewed       Endoscopy reviewed: Colonoscopy 3/20/2024  Impression:            - Hemorrhoids found on perianal exam.                          - Internal hemorrhoids.                          - One 1 mm polyp in the descending colon, removed                          with a jumbo cold forceps. Resected and retrieved.                          - One 3 mm polyp in the sigmoid colon, removed                          with a cold snare. Resected and retrieved.                          - The examination was otherwise normal.                          - The examined portion of the ileum was normal.   Recommendation:        - Discharge patient to home (ambulatory).                          - Resume regular diet.                          - Continue present medications.                          - Await pathology results.                          - Repeat colonoscopy in 7 years for surveillance.   Nguyen Victor MD   3/20/2024 10:36:56 AM     Assessment:  1. Chronic idiopathic constipation    2. Rectal bleeding              Recommendations:  Will try patient on Amitiza BID.   Ref for CRS      Thank you for allowing me to participate in this patient's care.    Sincerely,     Jacy Fuentes NP  Gastroenterology Department  [facility]            [1]   Current Outpatient Medications on File Prior to Visit   Medication Sig Dispense Refill    acyclovir (ZOVIRAX) 400 MG tablet       atorvastatin (LIPITOR) 20 MG tablet Take 1 tablet (20 mg total) by mouth once daily. 90 tablet 3    cholecalciferol, vitamin D3, 10 mcg (400 unit) Cap Take 1 capsule (400 Units total) by mouth once daily.  0    estradioL (ESTRACE) 0.01 % (0.1 mg/gram) vaginal cream APPLY 1 APPLICATORFUL VAGINALLY AT BEDTIME EVERY SUNDAY      estradiol 0.1 mg/24 hr td ptwk 0.1 mg/24 hr PTWK Place 1 patch onto the skin every 7 days.      fluticasone propionate (FLONASE) 50 mcg/actuation nasal spray 2 sprays (100 mcg total) by Each Nostril route once daily. 16 g 5    levocetirizine (XYZAL) 5 MG tablet Take 1 tablet (5 mg total) by mouth every evening. 90 tablet 1    LORazepam (ATIVAN) 1 MG tablet Take 1 mg by mouth as needed for Anxiety.      meloxicam (MOBIC) 15 MG tablet Take 1 tablet (15 mg total) by mouth once daily. 30 tablet 1    predniSONE (DELTASONE) 20 MG tablet Take 1 tablet (20 mg total) by mouth once daily. 5 tablet 0    progesterone (PROMETRIUM) 100 MG capsule Take 1 capsule (100 mg total) by mouth nightly. 30 capsule 11    vitamin B complex-folic acid (B COMPLEX 1, WITH FOLIC ACID,) 0.4 mg Tab Take 1 tablet by mouth once daily.      zolpidem (AMBIEN CR) 6.25 MG CR tablet Take 6.25 mg by mouth every evening.       No current facility-administered medications on file prior to visit.

## 2025-04-04 ENCOUNTER — TELEPHONE (OUTPATIENT)
Dept: SURGERY | Facility: CLINIC | Age: 65
End: 2025-04-04
Payer: COMMERCIAL

## 2025-04-04 NOTE — TELEPHONE ENCOUNTER
Spoke with patient regarding previous call. Explained that it may be best to come into clinic for an exam for intermittent rectal bleeding. Patient states that she prefers [place] location on Friday morning. Appointment scheduled with first available provider. Details confirmed verbally over phone and viewable in portal. Patient verbalizes understanding.

## 2025-04-16 ENCOUNTER — OFFICE VISIT (OUTPATIENT)
Dept: OBSTETRICS AND GYNECOLOGY | Facility: CLINIC | Age: 65
End: 2025-04-16
Payer: COMMERCIAL

## 2025-04-16 ENCOUNTER — TELEPHONE (OUTPATIENT)
Dept: OBSTETRICS AND GYNECOLOGY | Facility: CLINIC | Age: 65
End: 2025-04-16
Payer: COMMERCIAL

## 2025-04-16 VITALS
BODY MASS INDEX: 42.07 KG/M2 | WEIGHT: 222.69 LBS | DIASTOLIC BLOOD PRESSURE: 82 MMHG | SYSTOLIC BLOOD PRESSURE: 140 MMHG

## 2025-04-16 DIAGNOSIS — N95.1 MENOPAUSAL VAGINAL DRYNESS: ICD-10-CM

## 2025-04-16 DIAGNOSIS — N95.1 MENOPAUSAL SYMPTOMS: Primary | ICD-10-CM

## 2025-04-16 PROCEDURE — 99999 PR PBB SHADOW E&M-EST. PATIENT-LVL III: CPT | Mod: PBBFAC,,, | Performed by: NURSE PRACTITIONER

## 2025-04-16 PROCEDURE — 99213 OFFICE O/P EST LOW 20 MIN: CPT | Mod: S$GLB,,, | Performed by: NURSE PRACTITIONER

## 2025-04-16 RX ORDER — ESTRADIOL 0.1 MG/G
1 CREAM VAGINAL
Qty: 42.5 G | Refills: 1 | Status: SHIPPED | OUTPATIENT
Start: 2025-04-17 | End: 2026-04-17

## 2025-04-16 NOTE — TELEPHONE ENCOUNTER
----- Message from Teri Crawford NP sent at 4/16/2025 11:43 AM CDT -----  Needs next [facility] consult with kisha Schmitt

## 2025-04-24 ENCOUNTER — PATIENT MESSAGE (OUTPATIENT)
Dept: PRIMARY CARE CLINIC | Facility: CLINIC | Age: 65
End: 2025-04-24
Payer: COMMERCIAL

## 2025-05-16 DIAGNOSIS — M54.50 LOW BACK PAIN, UNSPECIFIED BACK PAIN LATERALITY, UNSPECIFIED CHRONICITY, UNSPECIFIED WHETHER SCIATICA PRESENT: Primary | ICD-10-CM

## 2025-05-16 DIAGNOSIS — M54.2 CERVICAL PAIN (NECK): ICD-10-CM

## 2025-05-21 ENCOUNTER — HOSPITAL ENCOUNTER (OUTPATIENT)
Dept: RADIOLOGY | Facility: HOSPITAL | Age: 65
Discharge: HOME | End: 2025-05-21
Attending: ORTHOPAEDIC SURGERY
Payer: COMMERCIAL

## 2025-05-21 ENCOUNTER — OFFICE VISIT (OUTPATIENT)
Dept: ORTHOPEDICS | Facility: CLINIC | Age: 65
End: 2025-05-21
Payer: COMMERCIAL

## 2025-05-21 VITALS — WEIGHT: 226.31 LBS | BODY MASS INDEX: 42.73 KG/M2 | HEIGHT: 61 IN

## 2025-05-21 DIAGNOSIS — M47.812 CERVICAL SPONDYLOSIS: ICD-10-CM

## 2025-05-21 DIAGNOSIS — M54.2 NECK PAIN: ICD-10-CM

## 2025-05-21 DIAGNOSIS — R26.89 IMBALANCE: ICD-10-CM

## 2025-05-21 DIAGNOSIS — M54.50 LOW BACK PAIN, UNSPECIFIED BACK PAIN LATERALITY, UNSPECIFIED CHRONICITY, UNSPECIFIED WHETHER SCIATICA PRESENT: ICD-10-CM

## 2025-05-21 DIAGNOSIS — M54.2 CERVICAL PAIN (NECK): ICD-10-CM

## 2025-05-21 DIAGNOSIS — M50.30 DDD (DEGENERATIVE DISC DISEASE), CERVICAL: ICD-10-CM

## 2025-05-21 DIAGNOSIS — M43.10 DEGENERATIVE SPONDYLOLISTHESIS: Primary | ICD-10-CM

## 2025-05-21 DIAGNOSIS — M51.362 DEGENERATION OF INTERVERTEBRAL DISC OF LUMBAR REGION WITH DISCOGENIC BACK PAIN AND LOWER EXTREMITY PAIN: ICD-10-CM

## 2025-05-21 DIAGNOSIS — M47.816 LUMBAR SPONDYLOSIS: ICD-10-CM

## 2025-05-21 DIAGNOSIS — M54.41 ACUTE BILATERAL LOW BACK PAIN WITH RIGHT-SIDED SCIATICA: ICD-10-CM

## 2025-05-21 PROCEDURE — 72120 X-RAY BEND ONLY L-S SPINE: CPT | Mod: 26,,, | Performed by: RADIOLOGY

## 2025-05-21 PROCEDURE — 72040 X-RAY EXAM NECK SPINE 2-3 VW: CPT | Mod: TC

## 2025-05-21 PROCEDURE — 99204 OFFICE O/P NEW MOD 45 MIN: CPT | Mod: S$GLB,,, | Performed by: ORTHOPAEDIC SURGERY

## 2025-05-21 PROCEDURE — 72120 X-RAY BEND ONLY L-S SPINE: CPT | Mod: TC

## 2025-05-21 PROCEDURE — 72040 X-RAY EXAM NECK SPINE 2-3 VW: CPT | Mod: 26,,, | Performed by: RADIOLOGY

## 2025-05-21 PROCEDURE — 99999 PR PBB SHADOW E&M-EST. PATIENT-LVL IV: CPT | Mod: PBBFAC,,, | Performed by: ORTHOPAEDIC SURGERY

## 2025-05-21 NOTE — PROGRESS NOTES
DATE: 5/22/2025  PATIENT: Sandra Velazquez    Attending Physician: Garett Espinoza M.D.    CHIEF COMPLAINT: neck pain; LBP    HISTORY:  Sandra Velazquez is a 65 y.o. female presents for initial evaluation of neck pain (Neck - 6). The pain has been present for years. The patient describes the pain as dull but it does not go down arms. The pain is worse with activity and improved by rest. There is no associated numbness and tingling. There is no subjective weakness. Prior treatments have included OTC meds, but no PT, ROHINI or surgery.     Patient also complained of years history of LBP without leg pain. She has LLE n/t and weakness.     The Patient endorses myelopathic symptoms such as handwriting changes or difficulty with buttons/coins/keys. She has trouble with walking/balance. Denies perineal paresthesias, bowel/bladder dysfunction.    The patient has been vaping for 4 years; she does not have DM or endorse IVDU. The patient is not on any blood thinners and does not take chronic narcotics.    PAST MEDICAL/SURGICAL HISTORY:  Past Medical History:   Diagnosis Date    Anxiety     Arthritis     Constipation     Deafness in right ear     Depression     Genital HSV     GERD (gastroesophageal reflux disease)     Hemorrhoids     Hyperlipidemia     BAR on CPAP     Pre-diabetes 02/02/2024     Past Surgical History:   Procedure Laterality Date    BILATERAL SALPINGO-OOPHORECTOMY (BSO)  2004    CARPAL TUNNEL RELEASE Bilateral     COLONOSCOPY N/A 03/20/2024    Procedure: COLONOSCOPY;  Surgeon: Nguyen Victor MD;  Location: [facility] END[facility];  Service: Endoscopy;  Laterality: N/A;  Referred by: Dr. FREYA Harrell  Prep: PEG  Route instructions sent: portal  Other concerns: + polyps per pt, also some mild rectal bleeding per pt  SW    TOTAL ABDOMINAL HYSTERECTOMY  2004       Current Medications: Current Medications[1]    Social History: Social History[2]    REVIEW OF SYSTEMS:  Constitution: Negative. Negative for chills, fever and night  "sweats.   Cardiovascular: Negative for chest pain and syncope.   Respiratory: Negative for cough and shortness of breath.   Gastrointestinal: See HPI. Negative for nausea/vomiting. Negative for abdominal pain.  Genitourinary: See HPI. Negative for discoloration or dysuria.  Skin: Negative for dry skin, itching and rash.   Hematologic/Lymphatic: negative for bleeding/clotting disorders.   Musculoskeletal: Negative for falls and muscle weakness.   Neurological: See HPI. no history of seizures. no history of cranial surgery or shunts.  Endocrine: Negative for polydipsia, polyphagia and polyuria.   Allergic/Immunologic: Negative for hives and persistent infections.  Psychiatric/Behavioral: Negative for depression and insomnia.         EXAM:  Ht 5' 1" (1.549 m)   Wt 102.7 kg (226 lb 4.8 oz)   LMP 01/01/2004 (Approximate) Comment: MELBA / CYNTHIA  2004  BMI 42.76 kg/m²     General: The patient is a 65 y.o. female in no apparent distress, the patient is orientatied to person, place and time.  Psych: Normal mood and affect  HEENT: Vision grossly intact, hearing intact to the spoken word.  Lungs: Respirations unlabored.  Gait: Normal station and gait, no difficulty with toe or heel walk.   Skin: Cervical skin negative for rashes, lesions, hairy patches and surgical scars.  Range of motion: Cervical range of motion is acceptable. There is posterior cervical and lower lumbar tenderness to palpation.  Spinal Balance: Global saggital and coronal spinal balance acceptable, no significant for scoliosis and kyphosis.  Musculoskeletal: No pain with the range of motion of the bilateral shoulders and elbows. Normal bulk and contour of the bilateral hands.  Vascular: Bilateral hands warm and well perfused, Radial pulses 2+ bilaterally.  Neurological: Normal strength and tone in all major motor groups in the bilateral upper and lower extremities. Normal sensation to light touch in the C5-T1 and L2-S1 dermatomes bilaterally.  Deep tendon " reflexes symmetric 2+ in the bilateral upper and lower extremities.  Negative Inverted Radial Reflex and Ruvalcaba's bilaterally. Negative Babinski bilaterally.     IMAGING:   Today I independently reviewed the following images and my interpretations are as follows:    AP, Lat and Flex/Ex  upright C-spine films demonstrate spondylosis and DDD. C2-3 facet fusion.    Lumbar Xrs showed spondylosis and DDD. L4-5 anterolisthesis measuring 6mm.    Body mass index is 42.76 kg/m².  Hemoglobin A1C   Date Value Ref Range Status   08/30/2024 5.7 (H) 4.0 - 5.6 % Final     Comment:     ADA Screening Guidelines:  5.7-6.4%  Consistent with prediabetes  >or=6.5%  Consistent with diabetes    High levels of fetal hemoglobin interfere with the HbA1C  assay. Heterozygous hemoglobin variants (HbS, HgC, etc)do  not significantly interfere with this assay.   However, presence of multiple variants may affect accuracy.     02/09/2024 5.6 4.0 - 5.6 % Final     Comment:     ADA Screening Guidelines:  5.7-6.4%  Consistent with prediabetes  >or=6.5%  Consistent with diabetes    High levels of fetal hemoglobin interfere with the HbA1C  assay. Heterozygous hemoglobin variants (HbS, HgC, etc)do  not significantly interfere with this assay.   However, presence of multiple variants may affect accuracy.     03/17/2023 5.7 (H) 4.0 - 5.6 % Final     Comment:     ADA Screening Guidelines:  5.7-6.4%  Consistent with prediabetes  >or=6.5%  Consistent with diabetes    High levels of fetal hemoglobin interfere with the HbA1C  assay. Heterozygous hemoglobin variants (HbS, HgC, etc)do  not significantly interfere with this assay.   However, presence of multiple variants may affect accuracy.         ASSESSMENT/PLAN:  Degenerative spondylolisthesis    Neck pain  -     Ambulatory referral/consult to Orthopedics    Acute bilateral low back pain with right-sided sciatica  -     Ambulatory referral/consult to Orthopedics    Cervical spondylosis  -     MRI Spine  Cervical-Thoracic-Lumbar Without Contrast (XPD); Future; Expected date: 05/21/2025  -     Ambulatory Referral/Consult to Physical Therapy; Future; Expected date: 05/28/2025    DDD (degenerative disc disease), cervical    Degeneration of intervertebral disc of lumbar region with discogenic back pain and lower extremity pain    Lumbar spondylosis  -     MRI Spine Cervical-Thoracic-Lumbar Without Contrast (XPD); Future; Expected date: 05/21/2025  -     Ambulatory Referral/Consult to Physical Therapy; Future; Expected date: 05/28/2025    Imbalance  -     MRI Spine Cervical-Thoracic-Lumbar Without Contrast (XPD); Future; Expected date: 05/21/2025      Follow up in about 4 weeks (around 6/18/2025).    Patient has cervical and lumbar spondylosis. Giver her myelopathic symptoms, I ordered entire spine MRI to evaluate stenosis. I discussed the natural history of their diagnoses as well as surgical and nonsurgical treatment options. I educated the patient on the importance of core/back strengthening, correct posture, bending/lifting ergonomics, and low-impact aerobic exercises (walking, elliptical, and aquatherapy). Continue medications. I will refer the patient to PT for neck and lower back. Patient will follow up in 4 weeks for MRI review.    Garett Espinoza MD  Orthopaedic Spine Surgeon  Department of Orthopaedic Surgery  [phone]           [1]   Current Outpatient Medications:     acyclovir (ZOVIRAX) 400 MG tablet, , Disp: , Rfl:     atorvastatin (LIPITOR) 20 MG tablet, Take 1 tablet (20 mg total) by mouth once daily., Disp: 90 tablet, Rfl: 3    cholecalciferol, vitamin D3, 10 mcg (400 unit) Cap, Take 1 capsule (400 Units total) by mouth once daily., Disp: , Rfl: 0    estradioL (ESTRACE) 0.01 % (0.1 mg/gram) vaginal cream, Place 1 g vaginally twice a week., Disp: 42.5 g, Rfl: 1    estradiol 0.1 mg/24 hr td ptwk 0.1 mg/24 hr PTWK, Place 1 patch onto the skin every 7 days., Disp: , Rfl:     fluticasone propionate (FLONASE)  50 mcg/actuation nasal spray, 2 sprays (100 mcg total) by Each Nostril route once daily., Disp: 16 g, Rfl: 5    levocetirizine (XYZAL) 5 MG tablet, Take 1 tablet (5 mg total) by mouth every evening., Disp: 90 tablet, Rfl: 1    LORazepam (ATIVAN) 1 MG tablet, Take 1 mg by mouth as needed for Anxiety., Disp: , Rfl:     lubiprostone (AMITIZA) 8 MCG Cap, Take 1 capsule (8 mcg total) by mouth 2 (two) times daily., Disp: 60 capsule, Rfl: 11    meloxicam (MOBIC) 15 MG tablet, Take 1 tablet (15 mg total) by mouth once daily., Disp: 30 tablet, Rfl: 1    predniSONE (DELTASONE) 20 MG tablet, Take 1 tablet (20 mg total) by mouth once daily., Disp: 5 tablet, Rfl: 0    progesterone (PROMETRIUM) 100 MG capsule, Take 1 capsule (100 mg total) by mouth nightly., Disp: 30 capsule, Rfl: 11    vitamin B complex-folic acid (B COMPLEX 1, WITH FOLIC ACID,) 0.4 mg Tab, Take 1 tablet by mouth once daily., Disp: , Rfl:     zolpidem (AMBIEN CR) 6.25 MG CR tablet, Take 6.25 mg by mouth every evening., Disp: , Rfl:   [2]   Social History  Socioeconomic History    Marital status:    Tobacco Use    Smoking status: Every Day     Types: Vaping with nicotine     Passive exposure: Never    Smokeless tobacco: Never    Tobacco comments:     Former cigarette smoking age 16 to 56, 1/3 PPD.   Substance and Sexual Activity    Alcohol use: Yes     Comment: Occasional.    Drug use: Never    Sexual activity: Yes     Partners: Male     Comment:    Social History Narrative    , 2 children - dtr local, son out of state.  for [employer].      Social Drivers of Health     Financial Resource Strain: Patient Declined (3/24/2025)    Overall Financial Resource Strain (CARDIA)     Difficulty of Paying Living Expenses: Patient declined   Food Insecurity: Patient Declined (3/24/2025)    Hunger Vital Sign     Worried About Running Out of Food in the Last Year: Patient declined     Ran Out of Food in the Last Year: Patient declined    Transportation Needs: Patient Declined (3/24/2025)    PRAPARE - Transportation     Lack of Transportation (Medical): Patient declined     Lack of Transportation (Non-Medical): Patient declined   Physical Activity: Insufficiently Active (3/24/2025)    Exercise Vital Sign     Days of Exercise per Week: 2 days     Minutes of Exercise per Session: 20 min   Stress: Stress Concern Present (3/24/2025)    [employer] - Occupational Stress Questionnaire     Feeling of Stress : Rather much   Housing Stability: Patient Declined (3/24/2025)    Housing Stability Vital Sign     Unable to Pay for Housing in the Last Year: Patient declined     Number of Times Moved in the Last Year: 0     Homeless in the Last Year: Patient declined

## 2025-07-08 ENCOUNTER — TELEPHONE (OUTPATIENT)
Dept: GASTROENTEROLOGY | Facility: CLINIC | Age: 65
End: 2025-07-08
Payer: COMMERCIAL

## 2025-08-07 NOTE — PROGRESS NOTES
Subjective:      Sandra Velazquez is a 65 y.o. female who is here for follow-up of hormone replacement therapy. Reports symptoms have improved with addition of Prometrium. Requesting Estrace vaginal cream refill.    No visits with results within 3 Month(s) from this visit.   Latest known visit with results is:   Lab Visit on 08/30/2024   Component Date Value Ref Range Status    Follicle Stimulating Hormone 08/30/2024 24.73  See Text mIU/mL Final    Estrone (Esoterix) 08/30/2024 61  pg/mL Final    Estradiol 08/30/2024 51  pg/mL Final    Estrogen 08/30/2024 112  pg/mL Final    LH 08/30/2024 12.0  See Text mIU/mL Final    WBC 08/30/2024 8.78  3.90 - 12.70 K/uL Final    RBC 08/30/2024 4.45  4.00 - 5.40 M/uL Final    Hemoglobin 08/30/2024 12.5  12.0 - 16.0 g/dL Final    Hematocrit 08/30/2024 39.9  37.0 - 48.5 % Final    MCV 08/30/2024 90  82 - 98 fL Final    MCH 08/30/2024 28.1  27.0 - 31.0 pg Final    MCHC 08/30/2024 31.3 (L)  32.0 - 36.0 g/dL Final    RDW 08/30/2024 13.5  11.5 - 14.5 % Final    Platelets 08/30/2024 308  150 - 450 K/uL Final    MPV 08/30/2024 9.9  9.2 - 12.9 fL Final    Immature Granulocytes 08/30/2024 0.3  0.0 - 0.5 % Final    Gran # (ANC) 08/30/2024 4.8  1.8 - 7.7 K/uL Final    Immature Grans (Abs) 08/30/2024 0.03  0.00 - 0.04 K/uL Final    Lymph # 08/30/2024 3.3  1.0 - 4.8 K/uL Final    Mono # 08/30/2024 0.6  0.3 - 1.0 K/uL Final    Eos # 08/30/2024 0.1  0.0 - 0.5 K/uL Final    Baso # 08/30/2024 0.06  0.00 - 0.20 K/uL Final    nRBC 08/30/2024 0  0 /100 WBC Final    Gran % 08/30/2024 54.5  38.0 - 73.0 % Final    Lymph % 08/30/2024 37.1  18.0 - 48.0 % Final    Mono % 08/30/2024 6.3  4.0 - 15.0 % Final    Eosinophil % 08/30/2024 1.1  0.0 - 8.0 % Final    Basophil % 08/30/2024 0.7  0.0 - 1.9 % Final    Differential Method 08/30/2024 Automated   Final    Sodium 08/30/2024 139  136 - 145 mmol/L Final    Potassium 08/30/2024 4.3  3.5 - 5.1 mmol/L Final    Chloride 08/30/2024 105  95 - 110 mmol/L Final    CO2  08/30/2024 25  23 - 29 mmol/L Final    Glucose 08/30/2024 82  70 - 110 mg/dL Final    BUN 08/30/2024 9  8 - 23 mg/dL Final    Creatinine 08/30/2024 0.9  0.5 - 1.4 mg/dL Final    Calcium 08/30/2024 9.8  8.7 - 10.5 mg/dL Final    Total Protein 08/30/2024 7.4  6.0 - 8.4 g/dL Final    Albumin 08/30/2024 3.7  3.5 - 5.2 g/dL Final    Total Bilirubin 08/30/2024 0.8  0.1 - 1.0 mg/dL Final    Alkaline Phosphatase 08/30/2024 64  55 - 135 U/L Final    AST 08/30/2024 15  10 - 40 U/L Final    ALT 08/30/2024 12  10 - 44 U/L Final    eGFR 08/30/2024 >60.0  >60 mL/min/1.73 m^2 Final    Anion Gap 08/30/2024 9  8 - 16 mmol/L Final    Cholesterol 08/30/2024 239 (H)  120 - 199 mg/dL Final    Triglycerides 08/30/2024 128  30 - 150 mg/dL Final    HDL 08/30/2024 51  40 - 75 mg/dL Final    LDL Cholesterol 08/30/2024 162.4 (H)  63.0 - 159.0 mg/dL Final    HDL/Cholesterol Ratio 08/30/2024 21.3  20.0 - 50.0 % Final    Total Cholesterol/HDL Ratio 08/30/2024 4.7  2.0 - 5.0 Final    Non-HDL Cholesterol 08/30/2024 188  mg/dL Final    Free T4 08/30/2024 0.94  0.71 - 1.51 ng/dL Final    TSH 08/30/2024 0.562  0.400 - 4.000 uIU/mL Final    Hemoglobin A1C 08/30/2024 5.7 (H)  4.0 - 5.6 % Final    Estimated Avg Glucose 08/30/2024 117  68 - 131 mg/dL Final       Past Medical History:   Diagnosis Date    Anxiety     Arthritis     Constipation     Deafness in right ear     Depression     Genital HSV     GERD (gastroesophageal reflux disease)     Hemorrhoids     Hyperlipidemia     BAR on CPAP     Pre-diabetes 02/02/2024     Past Surgical History:   Procedure Laterality Date    BILATERAL SALPINGO-OOPHORECTOMY (BSO)  2004    CARPAL TUNNEL RELEASE Bilateral     COLONOSCOPY N/A 03/20/2024    Procedure: COLONOSCOPY;  Surgeon: Nguyen Victor MD;  Location: [facility] ENDOSCOPY;  Service: Endoscopy;  Laterality: N/A;  Referred by: Dr. FREYA Harrell  Prep: PEG  Route instructions sent: portal  Other concerns: + polyps per pt, also some mild rectal bleeding per pt  SW     TOTAL ABDOMINAL HYSTERECTOMY       Social History[1]  Family History   Problem Relation Name Age of Onset    Asthma Mother          Digestive Issue / Maternal Cousins with Lupus    GI problems Mother      Stroke Father  65    Hypertension Father      Bell's palsy Father      Crohn's disease Father      Scoliosis Sister Amber     Hyperlipidemia Sister Claudette     Crohn's disease Daughter      Colon cancer Maternal Uncle      Breast cancer Neg Hx      Ovarian cancer Neg Hx      Heart attacks under age 50 Neg Hx       OB History    Para Term  AB Living   2 2 2   2   SAB IAB Ectopic Multiple Live Births       2      # Outcome Date GA Lbr Tha/2nd Weight Sex Type Anes PTL Lv   2 Term  40w0d  0.224 kg (7.9 oz) M Vag-Spont   DERIK   1 Term  40w0d  0.221 kg (7.8 oz) F Vag-Spont   DERIK     Current Medications[2]    Vitals:    25 1119   BP: (!) 140/82   Weight: 101 kg (222 lb 10.6 oz)     Body mass index is 42.07 kg/m².       Assessment:    Menopausal symptoms    Menopausal vaginal dryness  -     estradioL (ESTRACE) 0.01 % (0.1 mg/gram) vaginal cream; Place 1 g vaginally twice a week.  Dispense: 42.5 g; Refill: 1        Plan:   Risks and benefits of hormone replacement therapy were discussed.  Hormone replacement therapy options, including bioidentical versus non-bioidentical hormones, as well as alternatives discussed.    Follow current regimen.    Follow up in 6 months.  Instructed patient to call if she experiences any side effects or has any questions.       TIN Mcneil-C       [1]   Social History  Tobacco Use    Smoking status: Every Day     Types: Vaping with nicotine     Passive exposure: Never    Smokeless tobacco: Never    Tobacco comments:     Former cigarette smoking age 16 to 56, 1/3 PPD.   Substance Use Topics    Alcohol use: Yes     Comment: Occasional.    Drug use: Never   [2]   Current Outpatient Medications:     acyclovir (ZOVIRAX) 400 MG tablet, , Disp: , Rfl:      atorvastatin (LIPITOR) 20 MG tablet, Take 1 tablet (20 mg total) by mouth once daily., Disp: 90 tablet, Rfl: 3    cholecalciferol, vitamin D3, 10 mcg (400 unit) Cap, Take 1 capsule (400 Units total) by mouth once daily., Disp: , Rfl: 0    estradiol 0.1 mg/24 hr td ptwk 0.1 mg/24 hr PTWK, Place 1 patch onto the skin every 7 days., Disp: , Rfl:     fluticasone propionate (FLONASE) 50 mcg/actuation nasal spray, 2 sprays (100 mcg total) by Each Nostril route once daily., Disp: 16 g, Rfl: 5    levocetirizine (XYZAL) 5 MG tablet, Take 1 tablet (5 mg total) by mouth every evening., Disp: 90 tablet, Rfl: 1    lubiprostone (AMITIZA) 8 MCG Cap, Take 1 capsule (8 mcg total) by mouth 2 (two) times daily., Disp: 60 capsule, Rfl: 11    progesterone (PROMETRIUM) 100 MG capsule, Take 1 capsule (100 mg total) by mouth nightly., Disp: 30 capsule, Rfl: 11    vitamin B complex-folic acid (B COMPLEX 1, WITH FOLIC ACID,) 0.4 mg Tab, Take 1 tablet by mouth once daily., Disp: , Rfl:     zolpidem (AMBIEN CR) 6.25 MG CR tablet, Take 6.25 mg by mouth every evening., Disp: , Rfl:     estradioL (ESTRACE) 0.01 % (0.1 mg/gram) vaginal cream, Place 1 g vaginally twice a week., Disp: 42.5 g, Rfl: 1    LORazepam (ATIVAN) 1 MG tablet, Take 1 mg by mouth as needed for Anxiety., Disp: , Rfl:     meloxicam (MOBIC) 15 MG tablet, Take 1 tablet (15 mg total) by mouth once daily., Disp: 30 tablet, Rfl: 1    predniSONE (DELTASONE) 20 MG tablet, Take 1 tablet (20 mg total) by mouth once daily., Disp: 5 tablet, Rfl: 0

## 2025-08-15 ENCOUNTER — TELEPHONE (OUTPATIENT)
Dept: SURGERY | Facility: CLINIC | Age: 65
End: 2025-08-15
Payer: COMMERCIAL

## 2025-08-18 ENCOUNTER — OFFICE VISIT (OUTPATIENT)
Dept: SURGERY | Facility: CLINIC | Age: 65
End: 2025-08-18
Payer: COMMERCIAL

## 2025-08-18 VITALS
HEART RATE: 85 BPM | WEIGHT: 225.06 LBS | SYSTOLIC BLOOD PRESSURE: 121 MMHG | BODY MASS INDEX: 42.53 KG/M2 | DIASTOLIC BLOOD PRESSURE: 74 MMHG

## 2025-08-18 DIAGNOSIS — R32 URINARY INCONTINENCE, UNSPECIFIED TYPE: ICD-10-CM

## 2025-08-18 DIAGNOSIS — K64.8 INTERNAL BLEEDING HEMORRHOIDS: ICD-10-CM

## 2025-08-18 DIAGNOSIS — K64.4 INTERNAL AND EXTERNAL HEMORRHOIDS WITHOUT COMPLICATION: Primary | ICD-10-CM

## 2025-08-18 DIAGNOSIS — K64.8 INTERNAL AND EXTERNAL HEMORRHOIDS WITHOUT COMPLICATION: Primary | ICD-10-CM

## 2025-08-18 DIAGNOSIS — K59.04 CHRONIC IDIOPATHIC CONSTIPATION: ICD-10-CM

## 2025-08-18 PROCEDURE — 46600 DIAGNOSTIC ANOSCOPY SPX: CPT | Mod: S$GLB,,, | Performed by: NURSE PRACTITIONER

## 2025-08-18 PROCEDURE — 99999 PR PBB SHADOW E&M-EST. PATIENT-LVL IV: CPT | Mod: PBBFAC,,, | Performed by: NURSE PRACTITIONER

## 2025-08-18 PROCEDURE — 99204 OFFICE O/P NEW MOD 45 MIN: CPT | Mod: 25,S$GLB,, | Performed by: NURSE PRACTITIONER

## 2025-08-18 RX ORDER — HYDROCORTISONE 25 MG/G
CREAM TOPICAL 2 TIMES DAILY
Qty: 28 G | Refills: 2 | Status: SHIPPED | OUTPATIENT
Start: 2025-08-18

## 2025-08-29 ENCOUNTER — LAB VISIT (OUTPATIENT)
Dept: LAB | Facility: HOSPITAL | Age: 65
End: 2025-08-29
Attending: NURSE PRACTITIONER
Payer: COMMERCIAL

## 2025-08-29 ENCOUNTER — OFFICE VISIT (OUTPATIENT)
Dept: PRIMARY CARE CLINIC | Facility: CLINIC | Age: 65
End: 2025-08-29
Payer: COMMERCIAL

## 2025-08-29 VITALS
WEIGHT: 224.88 LBS | DIASTOLIC BLOOD PRESSURE: 68 MMHG | SYSTOLIC BLOOD PRESSURE: 122 MMHG | HEIGHT: 61 IN | BODY MASS INDEX: 42.46 KG/M2 | HEART RATE: 82 BPM | OXYGEN SATURATION: 98 %

## 2025-08-29 DIAGNOSIS — E78.5 HYPERLIPIDEMIA, UNSPECIFIED HYPERLIPIDEMIA TYPE: ICD-10-CM

## 2025-08-29 DIAGNOSIS — E78.5 HYPERLIPIDEMIA, UNSPECIFIED HYPERLIPIDEMIA TYPE: Primary | ICD-10-CM

## 2025-08-29 DIAGNOSIS — E66.813 CLASS 3 SEVERE OBESITY DUE TO EXCESS CALORIES WITHOUT SERIOUS COMORBIDITY WITH BODY MASS INDEX (BMI) OF 40.0 TO 44.9 IN ADULT: ICD-10-CM

## 2025-08-29 DIAGNOSIS — Z00.00 ROUTINE MEDICAL EXAM: ICD-10-CM

## 2025-08-29 DIAGNOSIS — G47.00 INSOMNIA, UNSPECIFIED TYPE: ICD-10-CM

## 2025-08-29 DIAGNOSIS — M54.42 LEFT-SIDED LOW BACK PAIN WITH SCIATICA, SCIATICA LATERALITY UNSPECIFIED, UNSPECIFIED CHRONICITY: ICD-10-CM

## 2025-08-29 LAB
ABSOLUTE EOSINOPHIL (OHS): 0.09 K/UL
ABSOLUTE MONOCYTE (OHS): 0.55 K/UL (ref 0.3–1)
ABSOLUTE NEUTROPHIL COUNT (OHS): 5.08 K/UL (ref 1.8–7.7)
ALBUMIN SERPL BCP-MCNC: 4 G/DL (ref 3.5–5.2)
ALP SERPL-CCNC: 66 UNIT/L (ref 40–150)
ALT SERPL W/O P-5'-P-CCNC: 19 UNIT/L (ref 0–55)
ANION GAP (OHS): 9 MMOL/L (ref 8–16)
AST SERPL-CCNC: 32 UNIT/L (ref 0–50)
BASOPHILS # BLD AUTO: 0.04 K/UL
BASOPHILS NFR BLD AUTO: 0.4 %
BILIRUB SERPL-MCNC: 0.7 MG/DL (ref 0.1–1)
BUN SERPL-MCNC: 8 MG/DL (ref 8–23)
CALCIUM SERPL-MCNC: 9.9 MG/DL (ref 8.7–10.5)
CHLORIDE SERPL-SCNC: 105 MMOL/L (ref 95–110)
CHOLEST SERPL-MCNC: 246 MG/DL (ref 120–199)
CHOLEST/HDLC SERPL: 4.9 {RATIO} (ref 2–5)
CO2 SERPL-SCNC: 25 MMOL/L (ref 23–29)
CREAT SERPL-MCNC: 0.8 MG/DL (ref 0.5–1.4)
EAG (OHS): 117 MG/DL (ref 68–131)
ERYTHROCYTE [DISTWIDTH] IN BLOOD BY AUTOMATED COUNT: 13.5 % (ref 11.5–14.5)
GFR SERPLBLD CREATININE-BSD FMLA CKD-EPI: >60 ML/MIN/1.73/M2
GLUCOSE SERPL-MCNC: 80 MG/DL (ref 70–110)
HBA1C MFR BLD: 5.7 % (ref 4–5.6)
HCT VFR BLD AUTO: 41.1 % (ref 37–48.5)
HDLC SERPL-MCNC: 50 MG/DL (ref 40–75)
HDLC SERPL: 20.3 % (ref 20–50)
HGB BLD-MCNC: 12.7 GM/DL (ref 12–16)
IMM GRANULOCYTES # BLD AUTO: 0.04 K/UL (ref 0–0.04)
IMM GRANULOCYTES NFR BLD AUTO: 0.4 % (ref 0–0.5)
LDLC SERPL CALC-MCNC: 177.2 MG/DL (ref 63–159)
LYMPHOCYTES # BLD AUTO: 3.34 K/UL (ref 1–4.8)
MCH RBC QN AUTO: 27.7 PG (ref 27–31)
MCHC RBC AUTO-ENTMCNC: 30.9 G/DL (ref 32–36)
MCV RBC AUTO: 90 FL (ref 82–98)
NONHDLC SERPL-MCNC: 196 MG/DL
NUCLEATED RBC (/100WBC) (OHS): 0 /100 WBC
PLATELET # BLD AUTO: 353 K/UL (ref 150–450)
PMV BLD AUTO: 10.2 FL (ref 9.2–12.9)
POTASSIUM SERPL-SCNC: 4.3 MMOL/L (ref 3.5–5.1)
PROT SERPL-MCNC: 7.6 GM/DL (ref 6–8.4)
RBC # BLD AUTO: 4.58 M/UL (ref 4–5.4)
RELATIVE EOSINOPHIL (OHS): 1 %
RELATIVE LYMPHOCYTE (OHS): 36.5 % (ref 18–48)
RELATIVE MONOCYTE (OHS): 6 % (ref 4–15)
RELATIVE NEUTROPHIL (OHS): 55.7 % (ref 38–73)
SODIUM SERPL-SCNC: 139 MMOL/L (ref 136–145)
T4 FREE SERPL-MCNC: 1.04 NG/DL (ref 0.71–1.51)
TRIGL SERPL-MCNC: 94 MG/DL (ref 30–150)
TSH SERPL-ACNC: 0.74 UIU/ML (ref 0.4–4)
WBC # BLD AUTO: 9.14 K/UL (ref 3.9–12.7)

## 2025-08-29 PROCEDURE — 84443 ASSAY THYROID STIM HORMONE: CPT

## 2025-08-29 PROCEDURE — 36415 COLL VENOUS BLD VENIPUNCTURE: CPT | Mod: PN

## 2025-08-29 PROCEDURE — 82040 ASSAY OF SERUM ALBUMIN: CPT

## 2025-08-29 PROCEDURE — 80061 LIPID PANEL: CPT

## 2025-08-29 PROCEDURE — 99999 PR PBB SHADOW E&M-EST. PATIENT-LVL V: CPT | Mod: PBBFAC,,, | Performed by: NURSE PRACTITIONER

## 2025-08-29 PROCEDURE — 84439 ASSAY OF FREE THYROXINE: CPT

## 2025-08-29 PROCEDURE — 85025 COMPLETE CBC W/AUTO DIFF WBC: CPT

## 2025-08-29 PROCEDURE — 83036 HEMOGLOBIN GLYCOSYLATED A1C: CPT

## 2025-08-29 RX ORDER — ZOLPIDEM TARTRATE 6.25 MG/1
6.25 TABLET, FILM COATED, EXTENDED RELEASE ORAL NIGHTLY
Qty: 30 TABLET | Refills: 2 | Status: SHIPPED | OUTPATIENT
Start: 2025-08-29

## 2025-08-29 RX ORDER — LORAZEPAM 1 MG/1
1 TABLET ORAL
Qty: 30 TABLET | Refills: 2 | Status: SHIPPED | OUTPATIENT
Start: 2025-08-29

## 2025-09-02 ENCOUNTER — RESULTS FOLLOW-UP (OUTPATIENT)
Dept: PRIMARY CARE CLINIC | Facility: CLINIC | Age: 65
End: 2025-09-02
Payer: COMMERCIAL

## 2025-09-02 DIAGNOSIS — E78.00 HYPERCHOLESTEREMIA: Primary | ICD-10-CM

## 2025-09-02 RX ORDER — ATORVASTATIN CALCIUM 40 MG/1
40 TABLET, FILM COATED ORAL DAILY
Qty: 90 TABLET | Refills: 3 | Status: SHIPPED | OUTPATIENT
Start: 2025-09-02 | End: 2026-09-02